# Patient Record
Sex: FEMALE | Race: WHITE | NOT HISPANIC OR LATINO | Employment: OTHER | URBAN - METROPOLITAN AREA
[De-identification: names, ages, dates, MRNs, and addresses within clinical notes are randomized per-mention and may not be internally consistent; named-entity substitution may affect disease eponyms.]

---

## 2022-01-11 ENCOUNTER — OUTSIDE FACILITY SERVICE (OUTPATIENT)
Dept: CARDIOLOGY | Facility: CLINIC | Age: 62
End: 2022-01-11

## 2022-01-11 PROCEDURE — 93306 TTE W/DOPPLER COMPLETE: CPT | Performed by: INTERNAL MEDICINE

## 2022-01-27 ENCOUNTER — PREP FOR SURGERY (OUTPATIENT)
Dept: OTHER | Facility: HOSPITAL | Age: 62
End: 2022-01-27

## 2022-01-27 ENCOUNTER — OFFICE VISIT (OUTPATIENT)
Dept: OBSTETRICS AND GYNECOLOGY | Facility: CLINIC | Age: 62
End: 2022-01-27

## 2022-01-27 VITALS
SYSTOLIC BLOOD PRESSURE: 142 MMHG | DIASTOLIC BLOOD PRESSURE: 80 MMHG | HEIGHT: 63 IN | BODY MASS INDEX: 51.91 KG/M2 | WEIGHT: 293 LBS

## 2022-01-27 DIAGNOSIS — N95.0 PMB (POSTMENOPAUSAL BLEEDING): Primary | ICD-10-CM

## 2022-01-27 DIAGNOSIS — R93.89 THICKENED ENDOMETRIUM: Primary | ICD-10-CM

## 2022-01-27 PROCEDURE — 99203 OFFICE O/P NEW LOW 30 MIN: CPT | Performed by: MIDWIFE

## 2022-01-27 RX ORDER — IRBESARTAN 150 MG/1
150 TABLET ORAL DAILY
COMMUNITY
Start: 2022-01-14 | End: 2022-04-18 | Stop reason: SDUPTHER

## 2022-01-27 RX ORDER — POTASSIUM CHLORIDE 20 MEQ/1
20 TABLET, EXTENDED RELEASE ORAL DAILY
COMMUNITY
Start: 2022-01-25 | End: 2022-05-31 | Stop reason: SDUPTHER

## 2022-01-27 RX ORDER — SODIUM CHLORIDE 0.9 % (FLUSH) 0.9 %
10 SYRINGE (ML) INJECTION AS NEEDED
Status: CANCELLED | OUTPATIENT
Start: 2022-01-27

## 2022-01-27 RX ORDER — IBUPROFEN 800 MG/1
800 TABLET ORAL 3 TIMES DAILY PRN
COMMUNITY
Start: 2022-01-14 | End: 2022-02-14

## 2022-01-27 RX ORDER — SODIUM CHLORIDE 0.9 % (FLUSH) 0.9 %
3 SYRINGE (ML) INJECTION EVERY 12 HOURS SCHEDULED
Status: CANCELLED | OUTPATIENT
Start: 2022-01-27

## 2022-01-27 RX ORDER — ASPIRIN 81 MG/1
81 TABLET ORAL DAILY
COMMUNITY
End: 2022-03-15

## 2022-01-27 RX ORDER — FUROSEMIDE 20 MG/1
20 TABLET ORAL 2 TIMES DAILY
COMMUNITY
Start: 2022-01-25 | End: 2022-05-31 | Stop reason: SDUPTHER

## 2022-01-27 NOTE — PROGRESS NOTES
"Chief Complaint   Patient presents with   • post menopausal bleeding     states she has been bleeding since 2021, has been menopausal since       Analisa Cortez is a 61 y.o. year old  presenting to be seen for PMB.  She went through menopause at age 50.  She is not on any hormones.  Her last Pap smear was over 5 years ago.  Her last mammogram was a couple of years ago.  She noticed vaginal bleeding 2021, she was diagnosed with a UTI and attributed it to that.  The day after Thanks[place], she had heavy bleeding that lasted a day but it had clots in it.   she had a heavy period that lasted 2 to 3 days.  She has noticed minimal spotting since then.    History  Past Medical History:   Diagnosis Date   • Hypertension    , Allergies:  Patient has no known allergies.    Social History    Tobacco Use      Smoking status: Never Smoker      Smokeless tobacco: Never Used      Review of Systems  Pertinent items are noted in HPI, all other systems reviewed and negative       Objective   /80   Ht 160 cm (63\")   Wt (!) 156 kg (343 lb)   BMI 60.76 kg/m²     Physical Exam:  General Appearance: alert, appears stated age and cooperative  Lungs: clear to auscultation, respirations regular, respirations even and respirations unlabored  Heart: regular rhythm and normal rate and normal S1, S2  Extremities: moves extremities well, no edema, no cyanosis and no redness  Skin: no bleeding, bruising or rash and no lesions noted  Neurologic: Mental Status orientated to person, place, time and situation, Speech normal content and execusion    Lab Review   No data reviewed    Imaging   Pelvic ultrasound report   Uterus anteverted, normal  Endometrial lining 29 mm         Assessment /Plan    Diagnoses and all orders for this visit:    1. PMB (postmenopausal bleeding) (Primary)  -     US Non-ob Transvaginal  Consulted with Dr. Pandey.    Follow up for D&C, hysteroscopy           This note was " electronically signed.  Nohelia Julian CNM  1/27/2022

## 2022-01-28 PROBLEM — R93.89 THICKENED ENDOMETRIUM: Status: ACTIVE | Noted: 2022-01-28

## 2022-01-31 ENCOUNTER — LAB (OUTPATIENT)
Dept: LAB | Facility: HOSPITAL | Age: 62
End: 2022-01-31

## 2022-01-31 ENCOUNTER — ANESTHESIA (OUTPATIENT)
Dept: PERIOP | Facility: HOSPITAL | Age: 62
End: 2022-01-31

## 2022-01-31 ENCOUNTER — HOSPITAL ENCOUNTER (OUTPATIENT)
Facility: HOSPITAL | Age: 62
Setting detail: HOSPITAL OUTPATIENT SURGERY
Discharge: HOME | End: 2022-01-31
Attending: OBSTETRICS & GYNECOLOGY | Admitting: OBSTETRICS & GYNECOLOGY

## 2022-01-31 ENCOUNTER — ANESTHESIA EVENT (OUTPATIENT)
Dept: PERIOP | Facility: HOSPITAL | Age: 62
End: 2022-01-31

## 2022-01-31 VITALS
TEMPERATURE: 98.1 F | RESPIRATION RATE: 18 BRPM | SYSTOLIC BLOOD PRESSURE: 140 MMHG | BODY MASS INDEX: 51.91 KG/M2 | DIASTOLIC BLOOD PRESSURE: 53 MMHG | HEIGHT: 63 IN | HEART RATE: 101 BPM | OXYGEN SATURATION: 97 % | WEIGHT: 293 LBS

## 2022-01-31 DIAGNOSIS — R93.89 THICKENED ENDOMETRIUM: ICD-10-CM

## 2022-01-31 LAB
AMORPH URATE CRY URNS QL MICRO: ABNORMAL /HPF
ANION GAP SERPL CALCULATED.3IONS-SCNC: 15.2 MMOL/L (ref 5–15)
BACTERIA UR QL AUTO: ABNORMAL /HPF
BASOPHILS # BLD AUTO: 0.03 10*3/MM3 (ref 0–0.2)
BASOPHILS NFR BLD AUTO: 0.5 % (ref 0–1.5)
BILIRUB UR QL STRIP: NEGATIVE
BUN SERPL-MCNC: 32 MG/DL (ref 8–23)
BUN/CREAT SERPL: 22.2 (ref 7–25)
CALCIUM SPEC-SCNC: 9.8 MG/DL (ref 8.6–10.5)
CHLORIDE SERPL-SCNC: 105 MMOL/L (ref 98–107)
CLARITY UR: CLEAR
CO2 SERPL-SCNC: 20.8 MMOL/L (ref 22–29)
COLOR UR: YELLOW
CREAT SERPL-MCNC: 1.44 MG/DL (ref 0.57–1)
DEPRECATED RDW RBC AUTO: 43.4 FL (ref 37–54)
EOSINOPHIL # BLD AUTO: 0.24 10*3/MM3 (ref 0–0.4)
EOSINOPHIL NFR BLD AUTO: 4.2 % (ref 0.3–6.2)
ERYTHROCYTE [DISTWIDTH] IN BLOOD BY AUTOMATED COUNT: 12.6 % (ref 12.3–15.4)
GFR SERPL CREATININE-BSD FRML MDRD: 37 ML/MIN/1.73
GLUCOSE SERPL-MCNC: 124 MG/DL (ref 65–99)
GLUCOSE UR STRIP-MCNC: NEGATIVE MG/DL
HCT VFR BLD AUTO: 42.9 % (ref 34–46.6)
HGB BLD-MCNC: 13.6 G/DL (ref 12–15.9)
HGB UR QL STRIP.AUTO: ABNORMAL
HYALINE CASTS UR QL AUTO: ABNORMAL /LPF
IMM GRANULOCYTES # BLD AUTO: 0.01 10*3/MM3 (ref 0–0.05)
IMM GRANULOCYTES NFR BLD AUTO: 0.2 % (ref 0–0.5)
KETONES UR QL STRIP: NEGATIVE
LEUKOCYTE ESTERASE UR QL STRIP.AUTO: ABNORMAL
LYMPHOCYTES # BLD AUTO: 0.61 10*3/MM3 (ref 0.7–3.1)
LYMPHOCYTES NFR BLD AUTO: 10.6 % (ref 19.6–45.3)
MCH RBC QN AUTO: 30 PG (ref 26.6–33)
MCHC RBC AUTO-ENTMCNC: 31.7 G/DL (ref 31.5–35.7)
MCV RBC AUTO: 94.7 FL (ref 79–97)
MONOCYTES # BLD AUTO: 0.37 10*3/MM3 (ref 0.1–0.9)
MONOCYTES NFR BLD AUTO: 6.4 % (ref 5–12)
NEUTROPHILS NFR BLD AUTO: 4.51 10*3/MM3 (ref 1.7–7)
NEUTROPHILS NFR BLD AUTO: 78.1 % (ref 42.7–76)
NITRITE UR QL STRIP: NEGATIVE
NRBC BLD AUTO-RTO: 0 /100 WBC (ref 0–0.2)
PH UR STRIP.AUTO: <=5 [PH] (ref 5–8)
PLATELET # BLD AUTO: 203 10*3/MM3 (ref 140–450)
PMV BLD AUTO: 12.2 FL (ref 6–12)
POTASSIUM SERPL-SCNC: 4.8 MMOL/L (ref 3.5–5.2)
PROT UR QL STRIP: NEGATIVE
QT INTERVAL: 340 MS
QTC INTERVAL: 482 MS
RBC # BLD AUTO: 4.53 10*6/MM3 (ref 3.77–5.28)
RBC # UR STRIP: ABNORMAL /HPF
REF LAB TEST METHOD: ABNORMAL
SARS-COV-2 RNA PNL SPEC NAA+PROBE: NOT DETECTED
SODIUM SERPL-SCNC: 141 MMOL/L (ref 136–145)
SP GR UR STRIP: 1.02 (ref 1–1.03)
SQUAMOUS #/AREA URNS HPF: ABNORMAL /HPF
UROBILINOGEN UR QL STRIP: ABNORMAL
WBC # UR STRIP: ABNORMAL /HPF
WBC NRBC COR # BLD: 5.77 10*3/MM3 (ref 3.4–10.8)

## 2022-01-31 PROCEDURE — 85025 COMPLETE CBC W/AUTO DIFF WBC: CPT | Performed by: OBSTETRICS & GYNECOLOGY

## 2022-01-31 PROCEDURE — 36415 COLL VENOUS BLD VENIPUNCTURE: CPT

## 2022-01-31 PROCEDURE — C9803 HOPD COVID-19 SPEC COLLECT: HCPCS

## 2022-01-31 PROCEDURE — 93005 ELECTROCARDIOGRAM TRACING: CPT | Performed by: OBSTETRICS & GYNECOLOGY

## 2022-01-31 PROCEDURE — S0260 H&P FOR SURGERY: HCPCS | Performed by: OBSTETRICS & GYNECOLOGY

## 2022-01-31 PROCEDURE — 0 LIDOCAINE 1 % SOLUTION: Performed by: OBSTETRICS & GYNECOLOGY

## 2022-01-31 PROCEDURE — 58120 DILATION AND CURETTAGE: CPT | Performed by: OBSTETRICS & GYNECOLOGY

## 2022-01-31 PROCEDURE — 80048 BASIC METABOLIC PNL TOTAL CA: CPT | Performed by: OBSTETRICS & GYNECOLOGY

## 2022-01-31 PROCEDURE — 87635 SARS-COV-2 COVID-19 AMP PRB: CPT | Performed by: OBSTETRICS & GYNECOLOGY

## 2022-01-31 PROCEDURE — 81001 URINALYSIS AUTO W/SCOPE: CPT | Performed by: OBSTETRICS & GYNECOLOGY

## 2022-01-31 PROCEDURE — 25010000002 FENTANYL CITRATE (PF) 50 MCG/ML SOLUTION: Performed by: NURSE ANESTHETIST, CERTIFIED REGISTERED

## 2022-01-31 PROCEDURE — 25010000002 MIDAZOLAM PER 1MG: Performed by: NURSE ANESTHETIST, CERTIFIED REGISTERED

## 2022-01-31 RX ORDER — IBUPROFEN 800 MG/1
800 TABLET ORAL EVERY 8 HOURS PRN
Qty: 30 TABLET | Refills: 0 | Status: SHIPPED | OUTPATIENT
Start: 2022-01-31 | End: 2022-02-14

## 2022-01-31 RX ORDER — SODIUM CHLORIDE 0.9 % (FLUSH) 0.9 %
10 SYRINGE (ML) INJECTION AS NEEDED
Status: DISCONTINUED | OUTPATIENT
Start: 2022-01-31 | End: 2022-01-31 | Stop reason: HOSPADM

## 2022-01-31 RX ORDER — ONDANSETRON HYDROCHLORIDE 8 MG/1
8 TABLET, FILM COATED ORAL EVERY 8 HOURS PRN
Qty: 10 TABLET | Refills: 0 | Status: SHIPPED | OUTPATIENT
Start: 2022-01-31 | End: 2022-02-14

## 2022-01-31 RX ORDER — PROMETHAZINE HYDROCHLORIDE 12.5 MG/1
12.5 TABLET ORAL ONCE AS NEEDED
Status: DISCONTINUED | OUTPATIENT
Start: 2022-01-31 | End: 2022-01-31 | Stop reason: HOSPADM

## 2022-01-31 RX ORDER — SODIUM CHLORIDE 0.9 % (FLUSH) 0.9 %
3 SYRINGE (ML) INJECTION EVERY 12 HOURS SCHEDULED
Status: DISCONTINUED | OUTPATIENT
Start: 2022-01-31 | End: 2022-01-31 | Stop reason: HOSPADM

## 2022-01-31 RX ORDER — LIDOCAINE HYDROCHLORIDE 10 MG/ML
INJECTION, SOLUTION INFILTRATION; PERINEURAL AS NEEDED
Status: DISCONTINUED | OUTPATIENT
Start: 2022-01-31 | End: 2022-01-31 | Stop reason: HOSPADM

## 2022-01-31 RX ORDER — MIDAZOLAM HYDROCHLORIDE 2 MG/2ML
INJECTION, SOLUTION INTRAMUSCULAR; INTRAVENOUS AS NEEDED
Status: DISCONTINUED | OUTPATIENT
Start: 2022-01-31 | End: 2022-01-31 | Stop reason: SURG

## 2022-01-31 RX ORDER — ACETAMINOPHEN 500 MG
1000 TABLET ORAL EVERY 8 HOURS PRN
Qty: 60 TABLET | Refills: 0 | Status: SHIPPED | OUTPATIENT
Start: 2022-01-31 | End: 2022-02-15

## 2022-01-31 RX ORDER — ONDANSETRON 4 MG/1
4 TABLET, FILM COATED ORAL ONCE AS NEEDED
Status: DISCONTINUED | OUTPATIENT
Start: 2022-01-31 | End: 2022-01-31 | Stop reason: HOSPADM

## 2022-01-31 RX ORDER — FENTANYL CITRATE 50 UG/ML
INJECTION, SOLUTION INTRAMUSCULAR; INTRAVENOUS AS NEEDED
Status: DISCONTINUED | OUTPATIENT
Start: 2022-01-31 | End: 2022-01-31 | Stop reason: SURG

## 2022-01-31 RX ORDER — KETAMINE HCL IN NACL, ISO-OSM 100MG/10ML
SYRINGE (ML) INJECTION AS NEEDED
Status: DISCONTINUED | OUTPATIENT
Start: 2022-01-31 | End: 2022-01-31 | Stop reason: SURG

## 2022-01-31 RX ORDER — LABETALOL HYDROCHLORIDE 5 MG/ML
INJECTION, SOLUTION INTRAVENOUS AS NEEDED
Status: DISCONTINUED | OUTPATIENT
Start: 2022-01-31 | End: 2022-01-31 | Stop reason: SURG

## 2022-01-31 RX ORDER — MAGNESIUM HYDROXIDE 1200 MG/15ML
LIQUID ORAL AS NEEDED
Status: DISCONTINUED | OUTPATIENT
Start: 2022-01-31 | End: 2022-01-31 | Stop reason: HOSPADM

## 2022-01-31 RX ORDER — SODIUM CHLORIDE, SODIUM LACTATE, POTASSIUM CHLORIDE, CALCIUM CHLORIDE 600; 310; 30; 20 MG/100ML; MG/100ML; MG/100ML; MG/100ML
1000 INJECTION, SOLUTION INTRAVENOUS CONTINUOUS
Status: DISCONTINUED | OUTPATIENT
Start: 2022-01-31 | End: 2022-01-31 | Stop reason: HOSPADM

## 2022-01-31 RX ORDER — ONDANSETRON 2 MG/ML
4 INJECTION INTRAMUSCULAR; INTRAVENOUS ONCE AS NEEDED
Status: DISCONTINUED | OUTPATIENT
Start: 2022-01-31 | End: 2022-01-31 | Stop reason: HOSPADM

## 2022-01-31 RX ADMIN — MIDAZOLAM HYDROCHLORIDE 1 MG: 1 INJECTION, SOLUTION INTRAMUSCULAR; INTRAVENOUS at 11:38

## 2022-01-31 RX ADMIN — MIDAZOLAM HYDROCHLORIDE 1 MG: 1 INJECTION, SOLUTION INTRAMUSCULAR; INTRAVENOUS at 11:45

## 2022-01-31 RX ADMIN — Medication 10 MG: at 11:38

## 2022-01-31 RX ADMIN — Medication 10 MG: at 11:45

## 2022-01-31 RX ADMIN — LABETALOL HYDROCHLORIDE 10 MG: 5 INJECTION, SOLUTION INTRAVENOUS at 11:44

## 2022-01-31 RX ADMIN — FENTANYL CITRATE 50 MCG: 50 INJECTION INTRAMUSCULAR; INTRAVENOUS at 11:53

## 2022-01-31 RX ADMIN — SODIUM CHLORIDE, POTASSIUM CHLORIDE, SODIUM LACTATE AND CALCIUM CHLORIDE 1000 ML: 600; 310; 30; 20 INJECTION, SOLUTION INTRAVENOUS at 11:24

## 2022-01-31 NOTE — ANESTHESIA POSTPROCEDURE EVALUATION
Patient: Analisa Cortez    Procedure Summary     Date: 01/31/22 Room / Location: [facility] OR 1 /  [facility]    Anesthesia Start: 1127 Anesthesia Stop: 1210    Procedure: DILATATION AND CURETTAGE HYSTEROSCOPY (N/A Uterus) Diagnosis:       Thickened endometrium      (Thickened endometrium [R93.89])    Surgeons: Estella Pandey MD Provider: Lee Ann Carter CRNA    Anesthesia Type: MAC ASA Status: 3          Anesthesia Type: MAC    Vitals  Vitals Value Taken Time   /48 01/31/22 1210   Temp 98.1 °F (36.7 °C) 01/31/22 1210   Pulse 115 01/31/22 1210   Resp 16 01/31/22 1210   SpO2 91 % 01/31/22 1210           Post Anesthesia Care and Evaluation    Patient location during evaluation: PHASE II  Patient participation: complete - patient participated  Level of consciousness: awake and alert  Pain score: 0  Pain management: satisfactory to patient  Airway patency: patent  Anesthetic complications: No anesthetic complications  PONV Status: none  Cardiovascular status: acceptable and stable  Respiratory status: acceptable  Hydration status: acceptable

## 2022-01-31 NOTE — ANESTHESIA PREPROCEDURE EVALUATION
Anesthesia Evaluation     Patient summary reviewed and Nursing notes reviewed   no history of anesthetic complications:  NPO Solid Status: > 8 hours  NPO Liquid Status: > 8 hours           Airway   Mallampati: II  TM distance: >3 FB  Possible difficult intubation  Dental - normal exam     Pulmonary - negative pulmonary ROS and normal exam   Cardiovascular - normal exam  Exercise tolerance: poor (<4 METS)    ECG reviewed    (+) hypertension poorly controlled 2 medications or greater, dysrhythmias Atrial Fib,     ROS comment: ECHO: EF 45-50%    Neuro/Psych- negative ROS  GI/Hepatic/Renal/Endo    (+) morbid obesity,      Musculoskeletal     Abdominal   (+) obese,    Substance History      OB/GYN          Other   arthritis,                    Anesthesia Plan    ASA 3     MAC   (Discussed POC with Dr. Pandey. Discussed risks and benefits. Decided to proceed with minimal sedation and biopsy to get diagnosis. Pt and daughter-in-law informed of risks and benefits )  intravenous induction     Anesthetic plan, all risks, benefits, and alternatives have been provided, discussed and informed consent has been obtained with: patient.    Plan discussed with CRNA.        CODE STATUS:

## 2022-02-02 LAB
LAB AP CASE REPORT: NORMAL
PATH REPORT.FINAL DX SPEC: NORMAL

## 2022-02-04 ENCOUNTER — DOCUMENTATION (OUTPATIENT)
Dept: CARDIOLOGY | Facility: CLINIC | Age: 62
End: 2022-02-04

## 2022-02-04 NOTE — PROGRESS NOTES
New Patient Office Visit      Patient Name: Analisa Cortez  : 1960     Location: [place]    ID: Analisa Cortez is a 61 y.o. female resident of [place], [place]    Chief Complaint:  Atrial fibrillation with RVR, surgical clearance for total hysterectomy    Problem list:  1. Atrial fibrillation  a. EKG 2022: Atrial fibrillation with RVR  2. Congestive heart failure  a. CXR 2021: Pulmonary vascular engorgement and cardiomegaly.  Calcified granuloma right upper lobe.  b. BNP 2021: 266  c. Echo 2022: LV systolic function mildly decreased.  LVEF 45 to 50%.  Trace MR  3. Hypertension  4. Morbid obesity with BMI of 70  5. Endometrioid adenocarcinoma, requiring surgical clearance  a. Pathology report 2022: Endometrioid carcinoma, referral to gynecologic oncology 2022  b. Patient scheduled for complete hysterectomy on 22. Oncology surgery would like to hold anticoagulation until immediately after surgery.  6. Surgical history  a. D&C hysteroscopy 2022    History of Present Illness: Analisa Cortez is a 61 y.o. female who presents to the cardiology office today to establish care for atrial fibrillation and for surgical clearance.  She has a past medical history significant for HTN, HFmEF, endometrial cancer, and new onset atrial fibrillation. The patient notes that she began the feel palpitations at the beginning of December, but was not officially diagnosed until . She was started on carvedilol which has since been switched to metoprolol 25 mg, Lasix 20 mg twice daily, aspirin 81 mg. The patient was not anticoagulated secondary to vaginal bleeding. The patient underwent D&C and was found to have grade 2 endometrial carcinoma and is scheduled for total hysterectomy on 2022. From a cardiac standpoint, the patient notes bilateral lower extremity edema and orthopnea. She sleeps in her recliner every night. She does deny any associated chest pain. Her  primary care ordered an echo on 2022 which revealed mildly decreased LV function with LVEF 45 to 50%. Trace MR. Today, the patient is in atrial fibrillation with a rate of 102..     Subjective      Review of Systems:   Review of Systems   Respiratory: Negative for cough, chest tightness, shortness of breath and wheezing.    Cardiovascular: Positive for palpitations and leg swelling. Negative for chest pain.   All other systems reviewed and are negative.      Past Medical History:   Past Medical History:   Diagnosis Date   • Arthritis    • Atrial fibrillation (HCC)    • Frequent UTI    • Heart rate fast     at times   • Hypertension    • Seasonal allergies    • Wears glasses     for reading       Past Surgical History:   Past Surgical History:   Procedure Laterality Date   • DILATATION AND CURETTAGE N/A 2022    Procedure: DILATATION AND CURETTAGE;  Surgeon: Estella Pandey MD;  Location: [facility];  Service: Obstetrics/Gynecology;  Laterality: N/A;   • NO PAST SURGERIES         Family History:   Family History   Problem Relation Age of Onset   • No Known Problems Father    • Osteoporosis Mother    • Arthritis Mother    • Cancer Maternal Aunt         Bladder   • Breast cancer Cousin    • Breast cancer Cousin    • Ovarian cancer Neg Hx    • Uterine cancer Neg Hx    • Colon cancer Neg Hx    • Melanoma Neg Hx    • Prostate cancer Neg Hx        Social History:   Social History     Socioeconomic History   • Marital status:    Tobacco Use   • Smoking status: Never Smoker   • Smokeless tobacco: Never Used   Vaping Use   • Vaping Use: Never used   Substance and Sexual Activity   • Alcohol use: Never   • Drug use: Never   • Sexual activity: Not Currently     Birth control/protection: Post-menopausal     Comment:          Tobacco History:   Social History     Tobacco Use   Smoking Status Never Smoker   Smokeless Tobacco Never Used       Medications:     Current Outpatient Medications:   •   "aspirin 81 MG EC tablet, Take 81 mg by mouth Daily., Disp: , Rfl:   •  furosemide (LASIX) 20 MG tablet, Take 20 mg by mouth 2 (Two) Times a Day., Disp: , Rfl:   •  ibuprofen (ADVIL,MOTRIN) 800 MG tablet, Take 800 mg by mouth Every 6 (Six) Hours As Needed for Mild Pain ., Disp: , Rfl:   •  irbesartan (AVAPRO) 150 MG tablet, Take 150 mg by mouth Daily., Disp: , Rfl:   •  potassium chloride (K-DUR,KLOR-CON) 20 MEQ CR tablet, Take 20 mEq by mouth Daily., Disp: , Rfl:   •  metoprolol tartrate (LOPRESSOR) 50 MG tablet, Take 1 tablet by mouth 2 (Two) Times a Day., Disp: 180 tablet, Rfl: 3    Allergies:   No Known Allergies    Objective     Vital Signs:   Vitals:    02/15/22 0955   BP: 140/76   BP Location: Right arm   Patient Position: Sitting   Pulse: 102   SpO2: 99%   Weight: (!) 155 kg (342 lb)   Height: 148.8 cm (58.6\")     Body mass index is 70.02 kg/m².     Physical Exam:  Physical Exam  Vitals and nursing note reviewed.   Constitutional:       Appearance: Normal appearance. She is obese.   HENT:      Head: Normocephalic and atraumatic.   Cardiovascular:      Rate and Rhythm: Rhythm irregular.      Pulses: Normal pulses.      Heart sounds: Normal heart sounds.      Comments: Atrial Fibrillation with RVR  Pulmonary:      Effort: Pulmonary effort is normal. No respiratory distress.      Breath sounds: No wheezing.   Abdominal:      Palpations: Abdomen is soft.   Neurological:      Mental Status: She is alert.         Assessment     Problems Addressed This Visit    ICD-10-CM ICD-9-CM   1. Atrial fibrillation with RVR ([facility])  I48.91 427.31   2. Primary hypertension  I10 401.9   3. Endometrial cancer ([facility])  C54.1 182.0   4. Morbid obesity with BMI of 70 and over, adult ([facility])  E66.01 278.01    Z68.45 V85.45     Plan   The patient is a pleasant 61-year-old female with hypertension and recent diagnosis of atrial fibrillation with uncontrolled ventricular rate. She is also been diagnosed with endometrial cancer with ongoing " vaginal bleeding. Anticoagulation therapy could not be initiated due to this reason. She is referred for further cardiac risk assessment and management of atrial fibrillation before proceeding to planned hysterectomy.   She has no current symptoms of angina or CHF and her ejection fraction is acceptable by echocardiogram.   Due to large body habitus she is not a suitable candidate for stress testing and I think it is reasonably acceptable to proceed to planned hysterectomy without need of further cardiac assessment considering preserved EF, no previous cardiac events and absence of angina/CHF type symptoms.   For management of atrial fibrillation at this time we will increase Lopressor to 50mg BID for better rate control.  Gynecology/Oncology has agreed to to start Eliquis 5mg BID after hysterectomy.  We will schedule GENOVEVA/ECV after optimal anticoagulation if patient is still in atrial fibrillation.  Okay to proceed with hysterectomy from cardiac standpoint.  Blood pressure is well controlled. Continue current regimen increased dose of metoprolol.  Follow-up with cardiology in 1 month.  Thank you for allowing us to participate in the care of your patient.    Plan of care reviewed with patient at the conclusion of today's visit. Education was provided regarding diagnosis and management.  Patient verbalizes understanding of and agreement with management plan.    Follow Up:   Return in about 4 weeks (around 3/15/2022).    Scribed for Natalia Davidson MD by Marina Freeman PA-C. 2/15/2022  10:25 Natalia POOL MD, personally performed the services described in this documentation as scribed by the above named individual in my presence, and it is both accurate and complete.  2/16/2022  06:29 EST

## 2022-02-08 ENCOUNTER — OFFICE VISIT (OUTPATIENT)
Dept: OBSTETRICS AND GYNECOLOGY | Facility: CLINIC | Age: 62
End: 2022-02-08

## 2022-02-08 VITALS
BODY MASS INDEX: 51.91 KG/M2 | DIASTOLIC BLOOD PRESSURE: 86 MMHG | SYSTOLIC BLOOD PRESSURE: 130 MMHG | HEIGHT: 63 IN | WEIGHT: 293 LBS

## 2022-02-08 DIAGNOSIS — Z09 POSTOPERATIVE FOLLOW-UP: Primary | ICD-10-CM

## 2022-02-08 DIAGNOSIS — C54.1 ENDOMETRIAL CANCER: ICD-10-CM

## 2022-02-08 PROCEDURE — 99024 POSTOP FOLLOW-UP VISIT: CPT | Performed by: PHYSICIAN ASSISTANT

## 2022-02-08 NOTE — PROGRESS NOTES
Subjective   Chief Complaint   Patient presents with   • Post-op     8 days post-op  D&C, doing well       Analisa Cortez is a 61 y.o. year old  presenting to be seen for post op visit  Patient accompanied by her daughter-in-law   She is doing well 8 days post op D&C hysteroscopy for postmenopausal bleeding   Normal bowel and bladder function. No vaginal bleeding or spotting for the past 4 days  Pathology unfortunately confirms well to moderately differentiated endometrioid adenocarcinoma      Past Medical History:   Diagnosis Date   • Arthritis    • Frequent UTI    • Heart rate fast     at times   • Hypertension    • Seasonal allergies    • Wears glasses     for reading        Current Outpatient Medications:   •  acetaminophen (TYLENOL) 500 MG tablet, Take 2 tablets by mouth Every 8 (Eight) Hours As Needed for Mild Pain  or Moderate Pain ., Disp: 60 tablet, Rfl: 0  •  aspirin 81 MG EC tablet, Take 81 mg by mouth Daily., Disp: , Rfl:   •  furosemide (LASIX) 20 MG tablet, Take 20 mg by mouth 2 (Two) Times a Day., Disp: , Rfl:   •  ibuprofen (ADVIL,MOTRIN) 800 MG tablet, Take 800 mg by mouth 3 (Three) Times a Day As Needed., Disp: , Rfl:   •  ibuprofen (ADVIL,MOTRIN) 800 MG tablet, Take 1 tablet by mouth Every 8 (Eight) Hours As Needed for Mild Pain ., Disp: 30 tablet, Rfl: 0  •  irbesartan (AVAPRO) 150 MG tablet, Take 150 mg by mouth Daily., Disp: , Rfl:   •  metoprolol tartrate (LOPRESSOR) 25 MG tablet, Take 25 mg by mouth 2 (Two) Times a Day., Disp: , Rfl:   •  ondansetron (ZOFRAN) 8 MG tablet, Take 1 tablet by mouth Every 8 (Eight) Hours As Needed for Nausea or Vomiting., Disp: 10 tablet, Rfl: 0  •  potassium chloride (K-DUR,KLOR-CON) 20 MEQ CR tablet, Take 20 mEq by mouth Daily., Disp: , Rfl:    No Known Allergies   Past Surgical History:   Procedure Laterality Date   • DILATATION AND CURETTAGE N/A 2022    Procedure: DILATATION AND CURETTAGE;  Surgeon: Estella Pandey MD;  Location: [facility];  Service:  "Obstetrics/Gynecology;  Laterality: N/A;   • NO PAST SURGERIES        Social History     Socioeconomic History   • Marital status:    Tobacco Use   • Smoking status: Never Smoker   • Smokeless tobacco: Never Used   Vaping Use   • Vaping Use: Never used   Substance and Sexual Activity   • Alcohol use: Never   • Drug use: Never   • Sexual activity: Defer     Birth control/protection: Post-menopausal      History reviewed. No pertinent family history.    Review of Systems   Constitutional: Negative for chills, diaphoresis and fever.   Gastrointestinal: Negative for constipation, diarrhea, nausea and vomiting.   Genitourinary: Negative for difficulty urinating, dysuria, pelvic pain and vaginal bleeding.           Objective   /86   Ht 160 cm (63\")   Wt (!) 156 kg (344 lb)   Breastfeeding No   BMI 60.94 kg/m²     Physical Exam  Constitutional:       Appearance: Normal appearance. She is well-developed and well-groomed. She is morbidly obese.   Eyes:      General: Lids are normal.      Extraocular Movements: Extraocular movements intact.   Abdominal:      General: Abdomen is protuberant.      Palpations: Abdomen is soft.      Tenderness: There is no abdominal tenderness.   Neurological:      Mental Status: She is alert.   Psychiatric:         Attention and Perception: Attention normal.         Mood and Affect: Mood normal.         Speech: Speech normal.         Behavior: Behavior is cooperative.            Result Review :       Data reviewed: pathology            Assessment and Plan  Diagnoses and all orders for this visit:    1. Postoperative follow-up (Primary)    2. Endometrial cancer (HCC)  -     Ambulatory Referral to Gynecologic Oncology      Patient Instructions   Follow up prn             This note was electronically signed.    Sonia Mayo PA-C   February 8, 2022  "

## 2022-02-14 ENCOUNTER — OFFICE VISIT (OUTPATIENT)
Dept: GYNECOLOGIC ONCOLOGY | Facility: CLINIC | Age: 62
End: 2022-02-14

## 2022-02-14 VITALS
WEIGHT: 293 LBS | SYSTOLIC BLOOD PRESSURE: 129 MMHG | RESPIRATION RATE: 20 BRPM | DIASTOLIC BLOOD PRESSURE: 95 MMHG | HEART RATE: 72 BPM | OXYGEN SATURATION: 99 % | TEMPERATURE: 96.9 F | BODY MASS INDEX: 59.07 KG/M2 | HEIGHT: 59 IN

## 2022-02-14 DIAGNOSIS — R06.09 DYSPNEA ON EXERTION: ICD-10-CM

## 2022-02-14 DIAGNOSIS — I48.19 PERSISTENT ATRIAL FIBRILLATION: ICD-10-CM

## 2022-02-14 DIAGNOSIS — C54.1 ENDOMETRIAL CANCER: Primary | ICD-10-CM

## 2022-02-14 DIAGNOSIS — I10 PRIMARY HYPERTENSION: ICD-10-CM

## 2022-02-14 DIAGNOSIS — E66.01 MORBID OBESITY WITH BMI OF 70 AND OVER, ADULT: ICD-10-CM

## 2022-02-14 PROCEDURE — 99205 OFFICE O/P NEW HI 60 MIN: CPT | Performed by: OBSTETRICS & GYNECOLOGY

## 2022-02-14 RX ORDER — OXYCODONE HYDROCHLORIDE 5 MG/1
5 TABLET ORAL ONCE
Status: CANCELLED | OUTPATIENT
Start: 2022-02-14 | End: 2022-02-14

## 2022-02-14 RX ORDER — ACETAMINOPHEN 325 MG/1
1000 TABLET ORAL ONCE
Status: CANCELLED | OUTPATIENT
Start: 2022-02-14 | End: 2022-02-14

## 2022-02-14 RX ORDER — MULTIVIT WITH MINERALS/LUTEIN
250 TABLET ORAL DAILY
COMMUNITY
End: 2022-02-15

## 2022-02-14 RX ORDER — HEPARIN SODIUM 5000 [USP'U]/ML
5000 INJECTION, SOLUTION INTRAVENOUS; SUBCUTANEOUS ONCE
Status: CANCELLED | OUTPATIENT
Start: 2022-02-14 | End: 2022-02-14

## 2022-02-14 RX ORDER — GABAPENTIN 100 MG/1
300 CAPSULE ORAL ONCE
Status: CANCELLED | OUTPATIENT
Start: 2022-02-14 | End: 2022-02-14

## 2022-02-14 RX ORDER — SODIUM CHLORIDE, SODIUM LACTATE, POTASSIUM CHLORIDE, CALCIUM CHLORIDE 600; 310; 30; 20 MG/100ML; MG/100ML; MG/100ML; MG/100ML
100 INJECTION, SOLUTION INTRAVENOUS CONTINUOUS
Status: CANCELLED | OUTPATIENT
Start: 2022-02-14

## 2022-02-14 NOTE — PROGRESS NOTES
New Patient Office Visit      Patient Name: Analisa Cortez  : 1960   MRN: [id number]     Referring Physician: Sonia Mayo PA-C / Dr. Estella Pandey    Chief Complaint:    Chief Complaint   Patient presents with   • Consult     Endometrial Cancer       History of Present Illness: Analisa Cortez is a 61 y.o. female who is here today as a consultation with gynecologic oncology.  She started having postmenopausal bleeding in 10/2021. She had a UTI at that time and originally she thought that was related to the bleeding. However, she continued to have a few episodes of spotting and some small blood clots in November and December. She went to see Dr. Pandey in January and she had an US that showed an endometrial thickness of 29 mm and underwent hysteroscopy D&C on 22. Pathology report for that showed FIGO grade 2 endometrioid adenocarcinoma. Since surgery, she reports that she is doing well and has not had any more vaginal bleeding. She was recently diagnosed with atrial fibrillation and is going to see cardiology tomorrow.     Oncologic History:  Oncology/Hematology History   Endometrial cancer (HCC)   2022 Initial Diagnosis    Referred by LATONIA Leung/Dr. Estella Pandey    Patient presented with postmenopausal bleeding    2022: TVUS with uterus with endometrial stripe thickness of 29 mm.  Unable to visualize either ovary.  No obvious adnexal masses.  Scant free fluid.  2022: Hysteroscopy with D&C with well to moderately differentiated endometrioid adenocarcinoma          Past Medical History:   Past Medical History:   Diagnosis Date   • Arthritis    • Atrial fibrillation (HCC)    • Frequent UTI    • Heart rate fast     at times   • Hypertension    • Seasonal allergies    • Wears glasses     for reading       Past Surgical History:   Past Surgical History:   Procedure Laterality Date   • DILATATION AND CURETTAGE N/A 2022    Procedure: DILATATION AND CURETTAGE;  Surgeon: Estella Pandey,  MD;  Location: [facility];  Service: Obstetrics/Gynecology;  Laterality: N/A;   • NO PAST SURGERIES         Family History:   Family History   Problem Relation Age of Onset   • No Known Problems Father    • Osteoporosis Mother    • Arthritis Mother    • Cancer Maternal Aunt         Bladder   • Breast cancer Cousin    • Breast cancer Cousin    • Ovarian cancer Neg Hx    • Uterine cancer Neg Hx    • Colon cancer Neg Hx    • Melanoma Neg Hx    • Prostate cancer Neg Hx        Social History:   Social History     Socioeconomic History   • Marital status:    Tobacco Use   • Smoking status: Never Smoker   • Smokeless tobacco: Never Used   Vaping Use   • Vaping Use: Never used   Substance and Sexual Activity   • Alcohol use: Never   • Drug use: Never   • Sexual activity: Not Currently     Birth control/protection: Post-menopausal     Comment:          Past OB/GYN History:   OB History    Para Term  AB Living      SAB IAB Ectopic Molar Multiple Live Births                    # Outcome Date GA Lbr Carmelo/2nd Weight Sex Delivery Anes PTL Lv   1 Term                 Health Maintenance:   Mammogram: Date: ? Results: Reportedly normal  Colonoscopy: Date: Never done Results: N/A    Medications:     Current Outpatient Medications:   •  acetaminophen (TYLENOL) 500 MG tablet, Take 2 tablets by mouth Every 8 (Eight) Hours As Needed for Mild Pain  or Moderate Pain ., Disp: 60 tablet, Rfl: 0  •  aspirin 81 MG EC tablet, Take 81 mg by mouth Daily., Disp: , Rfl:   •  furosemide (LASIX) 20 MG tablet, Take 20 mg by mouth 2 (Two) Times a Day., Disp: , Rfl:   •  irbesartan (AVAPRO) 150 MG tablet, Take 150 mg by mouth Daily., Disp: , Rfl:   •  metoprolol tartrate (LOPRESSOR) 25 MG tablet, Take 25 mg by mouth 2 (Two) Times a Day., Disp: , Rfl:   •  potassium chloride (K-DUR,KLOR-CON) 20 MEQ CR tablet, Take 20 mEq by mouth Daily., Disp: , Rfl:   •  vitamin C (ASCORBIC ACID) 250 MG tablet, Take  "250 mg by mouth Daily., Disp: , Rfl:     Allergies:   No Known Allergies    Review of Systems:   Review of Systems   Constitutional: Negative for appetite change, chills, fever and unexpected weight change.   HENT: Negative for sneezing and sore throat.    Respiratory: Positive for shortness of breath.    Cardiovascular: Positive for palpitations. Negative for chest pain and leg swelling.   Gastrointestinal: Negative for abdominal distention, abdominal pain, constipation, diarrhea, nausea and vomiting.   Genitourinary: Negative for difficulty urinating, dyspareunia, dysuria, vaginal bleeding, vaginal discharge and vaginal pain.   Musculoskeletal: Positive for back pain and joint swelling.   Neurological: Negative for light-headedness and headaches.        Objective     Physical Exam:  Vital Signs:   Vitals:    02/14/22 1324   BP: 129/95  Comment: Right Wrist   Pulse: 72   Resp: 20   Temp: 96.9 °F (36.1 °C)   TempSrc: Infrared   SpO2: 99%  Comment: RA   Weight: (!) 155 kg (341 lb)   Height: 148.6 cm (58.5\")   PainSc: 0-No pain     BMI: Body mass index is 70.06 kg/m².   ECOG score: 0           PHQ-2 Depression Screening  Little interest or pleasure in doing things? 0   Feeling down, depressed, or hopeless? 0   PHQ-2 Total Score 0     Physical Exam  Vitals and nursing note reviewed. Exam conducted with a chaperone present.   Constitutional:       General: She is not in acute distress.     Appearance: Normal appearance. She is well-developed. She is obese. She is not ill-appearing or diaphoretic.   HENT:      Head: Normocephalic and atraumatic.      Right Ear: External ear normal.      Left Ear: External ear normal.      Mouth/Throat:      Comments: Mask in place.   Eyes:      General: No scleral icterus.        Right eye: No discharge.         Left eye: No discharge.      Conjunctiva/sclera: Conjunctivae normal.   Neck:      Thyroid: No thyromegaly.   Cardiovascular:      Rate and Rhythm: Normal rate. Rhythm irregular. "      Heart sounds: Normal heart sounds. No murmur heard.      Pulmonary:      Effort: Pulmonary effort is normal. No respiratory distress.      Breath sounds: Normal breath sounds. No wheezing.   Abdominal:      General: There is no distension.      Palpations: Abdomen is soft. There is no mass.      Tenderness: There is no abdominal tenderness. There is no guarding.      Hernia: No hernia is present.      Comments: Exam limited by patient's body habitus.    Genitourinary:     Exam position: Lithotomy position.      Labia:         Right: No lesion.         Left: No lesion.       Vagina: Normal. No vaginal discharge or lesions.      Cervix: No lesion or cervical bleeding.      Comments: Uterus and adnexa unable to be palpated due to patient's body habitus  Musculoskeletal:         General: No swelling, tenderness, deformity or signs of injury. Normal range of motion.      Cervical back: Normal range of motion and neck supple.      Right lower leg: No edema.      Left lower leg: No edema.   Lymphadenopathy:      Cervical: No cervical adenopathy.   Skin:     General: Skin is warm and dry.      Findings: No erythema, lesion or rash.   Neurological:      General: No focal deficit present.      Mental Status: She is alert and oriented to person, place, and time. Mental status is at baseline.   Psychiatric:         Mood and Affect: Mood normal.         Behavior: Behavior normal.         Thought Content: Thought content normal.       Imaging: Images reviewed directly by me. Agree with impression.  1/27/2022: TVUS with uterus with endometrial stripe thickness of 29 mm.  Unable to visualize either ovary.  No obvious adnexal masses.  Scant free fluid.    Pathology:  1/31/2022: Hysteroscopy with D&C with well to moderately differentiated endometrioid adenocarcinoma  Assessment / Plan    Analisa Cortez is a 61 y.o. female who is recently diagnosed with FIGO grade 2 endometrial cancer.  While surgery is typically the cornerstone  of management for endometrial cancer we did discuss alternatives of treatment especially in light of the patient's super morbid obesity.  We discussed the alternatives of use of hormonal management with IUD placement as well as radiation.  I am concerned about the ability to adequately dose patient given her body habitus leading to a much higher risk of progression or recurrence of disease.  We also discussed that given the grade 2 findings that I am concerned that she would feel hormonal management.  After an extensive discussion including discussion of surgical risk (see below) the patient would like to proceed with attempted surgical management.    We discussed surgical management via robotic hysterectomy, bilateral salpingo-oophorectomy with sentinel lymph node evaluation +/- complete lymphadenectomy as indicated.  The patient has minimal descensus on exam today and it would not be a good candidate for vaginal hysterectomy.The patient was also counseled extensively on the nature of endometrial cancer and the fact that a majority are found in the early stages. However, if more advanced disease is encountered, she may require chemotherapy, radiation or a combination of the two. We also discussed risks of the procedure including bleeding, infection, wound breakdown, blood clots, injury to surrounding organs including the bowel, bladder, blood vessels, nerves, and ureters requiring additional intervention or procedures. We also discussed the risk of development of lymphedema particularly if a complete lymphadenectomy is needed. and the need for a laparotomy if the surgery cannot be performed safely via the minimally invasive approach. We also discussed the anticipated hospital stay and recovery time. All of the patient's questions were answered satisfactorily and verbal consent was obtained. Labs, EKG, CXR and COVID test ordered as below.    Morbid obesity (current Body mass index is 70.06 kg/m².): I counseled her  that excess body weight is associated with the development of many health issues (including cancer and heart disease) and increases the risk of surgical complications. Today, we reviewed in extensive detail the impact of obesity on the ability to perform robotic surgery. We discussed the risk of inability to ventilate the patient adequately in the required steep Trendelenburg position.  In the event that we are unable to do this we would need to perform an exploratory laparotomy for completion of surgery.  We discussed that in the event that laparotomy is needed that given the patient's obesity she is at a much higher risk of surgical complications including infection and wound complications.    Atrial fibrillation: Currently on metoprolol.  Has appointment cardiology tomorrow and last for surgery clearance.  In the event that they recommend anticoagulation this would be reasonable to start immediately postoperatively.  In the event that cardiology does not recommend anticoagulation we will plan to discharge the patient home with 30 days of anticoagulation regardless.    Shortness of air: Patient reports that this is somewhat improved since starting metoprolol for atrial fibrillation.  Does have dyspnea on exertion but is able to ambulate unassisted.  See above for discussion about concerns for surgical positioning.  We will have her meet with anesthesia prior to surgery to ensure optimization.    Hypertension: Currently very well controlled on metoprolol and irbesartan    Pain assessment was performed today as a part of patient’s care.  For patients with pain related to surgery, gynecologic malignancy or cancer treatment, the plan is as noted in the assessment/plan.  For patients with pain not related to these issues, they are to seek any further needed care from a more appropriate provider, such as PCP.      Diagnoses and all orders for this visit:    1. Endometrial cancer (HCC) (Primary)  -     Case Request;  Standing  -     COVID PRE-OP / PRE-PROCEDURE SCREENING ORDER (NO ISOLATION) - Swab, Nasopharynx; Future  -     CBC and Differential; Future  -     Comprehensive metabolic panel; Future  -     Hemoglobin A1c; Future  -     Type and screen; Future  -     ECG 12 Lead; Future  -     XR chest 2 vw; Future  -     Case Request    2. Morbid obesity with BMI of 70 and over, adult (HCC)    3. Persistent atrial fibrillation (HCC)    4. Dyspnea on exertion    5. Primary hypertension    Other orders  -     Follow anesthesia standing orders.; Future  -     Obtain informed consent  -     Provide NPO Instructions to Patient; Future  -     Chlorhexidine Skin Prep; Future         Follow Up: Surgery    Kathie Britt MD  Gynecologic Oncology Resident    Patient was seen and examined with Dr. Britt,  resident, who performed portions of the examination and documentation for this patient's care under my direct supervision.  I agree with the above documentation and plan.    ARIES Marie MD  Gynecologic Oncology

## 2022-02-14 NOTE — PATIENT INSTRUCTIONS
"              Laparoscopic Surgery Instructions            Analisa Cortez  [id number]  1960      SURGEON:  Maria Elena Marie MD    Surgery Coordinator: Ronel   If you have any questions before or after surgery please call.  [phone]        Appointment      2. You have pre-admission testing (PAT) appointment on 02/16/2022 at 11:15 am  You will need to be at hospital registration 10 minutes before that time. The registration department is located in the long hallway between the [facility] and [address] [place] [address]. This is on the first floor of the [facility] hospital you can enter through the [address] [place] [address].      3.  Your surgery has been scheduled on 02/18/2022.  You will need to be at the [address] [place] [address] second floor surgery registration on that day at 10:00 am    The Day(s) Before Surgery     ·  Nothing by mouth after midnight on 02/17/2022    · Plan to have someone take you home from the hospital.    · Do not use any products that contain nicotine or tobacco, such as cigarettes and e-cigarettes. These can delay healing after surgery. If you need help quitting, ask your health care provider.    ·  Do not take vitamins or aspirin one week before surgery ( if applicable). Do not take Ibuprofen or NSAIDs 5 days prior to Surgery. Do not take ACE or ARB medications for blood pressure the morning of surgery. If you are taking insulin, take 1/2 dose insulin the night prior to surgery.   Bring them with you to the hospital (Diabetic patient should bring insulin if instructed by the managing physician). If you are taking a blood thinner ( Eliquis, Coumadin, Xarelto, Lovenox, Asprin, Heparin, etc.) please have the provider that manages this instruct you on when to stop taking prior to surgery.     · Continue antidepressants, Beta Blockers \"olol\", anti-seizure medication, GERD medication (heartburn), Opioids and Parkinson's medication.     · Suboxone and Phentermine needs to stopped 7 days prior to surgery.      · If you are feeling sick, " have a fever or cough and have seen your PCP let our office know 48 hours prior to surgery. It may be subject to rescheduling.         The Day of Surgery:    Do not chew gum or tobacco, smoke, or eat mints or hard candy. Shower and wash your hair. You may brush your teeth but  do not swallow water. Use any wipes that Pre-admission testing has given you.     Please arrive for surgery as instructed by the pre-op nurse, often one to two hours before your surgery.    • Remove all jewelry, including rings and piercings. Do not bring valuables to the hospital.    • Wear loose-fitting clothing.    • Avoid wearing eye makeup or contact lenses    • Please remember to bring:  ? Photo ID and medical insurance card  ? Advanced directives, living will or power of  (if applicable)  ? Current list of all medications, including over-the- counter and herbal supplements  ? List of allergies  ? CPAP device if you have sleep apnea  ? Any assistive devices or equipment needed after surgery  ? Books/magazines to pass the time    • When you arrive, check in at the surgery registration desk on the second floor of the [address] [place] [address].     • Once you are called to go to your pre-op room, no one will be allowed in the pre op room.     Please note no one under age 12 is permitted to stay in the waiting area without supervision.    We make every effort to begin surgery at your scheduled start time but delays do occur. We will keep you and your family  updated about any delays.    While You are In the Pre-Op Room:  • The nurse will review your health history and will place an IV (into the vein) in your hand or arm for fluids and  medicines.  • An anesthesia provider will talk with you about anesthesia and pain control during and after surgery.  • A member of the surgical team can answer your questions.             What can I expect after the procedure?    After Surgery and/or Discharge:    After surgery you will be moved to the recovery  area. Recovery and discharge times will vary depending on the procedure.    The recovery room nurse will provide your family/visitors with updates. Visitors are not permitted in the immediate postoperative recovery area, but your visitors will be notified when they can come to see you after surgery.    If you will not be admitted to the hospital, your nurse will assess your readiness to go home. This includes your:    • Ability to walk, use crutches, etc.  • Ability to urinate adequate amounts  • Nausea and pain control    Before discharge, you will receive written instructions about how to care for yourself at home along with any prescriptions  for medications.     For your safety, you will need a responsible adult age 18 or older to accompany you home.     Please have a ride arranged and available when you are ready to leave. You cannot leave alone and it is recommended someone stay with you for the first 24 hours after anesthesia.       After the procedure, it is common to have:    · Pain.  · Soreness and numbness in your incision areas.  · Vaginal bleeding and discharge up to 6 weeks after surgery.  · Constipation.  · Temporary change in bladder function.  · Feelings of sadness or other emotions.  · Small amounts of clear drainage from incisions  · If you are discharged with an abdominal binder, this is to be used as needed for incisional comfort. You may choose to discontinue use at any time.           Follow these instructions at home:  Medicines    · Please take any medications that have been prescribed after your surgery, you may take over the counter Tylenol and Ibuprofen, unless told otherwise by your provider.   · Please take your stool softener as prescribed. If you do not have a bowel movement within 24 hours following surgery try a laxative (milk of magnesia) or you may take Berta-Lax.    Activity    · Return to your normal activities as told by your health care provider.   · You may be told to take  short walks every day and go farther each time.  · Do not lift anything that is heavier than 20 lbs.      General instructions    · Do not put anything in your vagina for 6 weeks after your surgery or as told by your health care provider. This includes tampons and douches.  · Do not have sex until your health care provider says you can.  · Do not take baths, swim, or use a hot tub until your health care provider approves.  · Drink enough fluid to keep your urine clear or pale yellow.  · Do not drive for 24 hours if you were given a sedative.  · Do not drive while taking Narcotic Pain medication ( oxycodone, hydrocodone, etc.).   · Keep all follow-up visits as told by your health care provider. This is important. You will have a post op appointment typically 3 weeks after surgery.  · Make sure you are urinating on a scheduled basis, for example every 2 hours. This will help retrain your bladder after surgery and prevent urinary tract infections.     Contact a health care provider if:    · Your pain medicine is not helping.  · You have a fever over 101.0  · You have redness, swelling, or pain at your incision site.  · You continue to have difficulty urinating.  · You have not had a bowel movement 2-3 days after surgery, experience nausea and vomiting, are unable to pass gas.   · Large volumes of drainage or blood from incisions, requiring multiple guaze changes.     Get help right away if:    · You have severe abdominal or back pain that is not controlled with medication.  · You have heavy bleeding from your vagina that saturates a maxi pad within 1-2 hours. Note- vaginal bleeding and spotting is normal up to 6 weeks from a hysterectomy, Heavy Bleeding is not.     If you experience a medical emergency call 911 or have someone drive you to your nearest emergency department.         Parking Information:    Free parking is available for patients and guests. There is  parking at the [address] in front of the  [facility]. Parking is also available in the [facility] and [facility].             Financial Assistance:    If you have any questions or need assistance, contact your [facility] financial counseling office from 8:30 a.m.-4:30  p.m. Monday through Friday. Closed weekends.    •  [place]: [phone] or, or visit at [address] [facility], near the entrance.        Patient Payments and Correspondence    Customer service representatives are available to assist you from 8:00 a.m. to 6:00 p.m. Eastern Standard Time by calling [phone] Monday through Friday. You can also contact us through [employer].    [facility]  [id number]  [place] [address] [phone]

## 2022-02-14 NOTE — H&P (VIEW-ONLY)
New Patient Office Visit      Patient Name: Analisa Cortez  : 1960   MRN: [id number]     Referring Physician: Sonia Mayo PA-C / Dr. Estella Pandey    Chief Complaint:    Chief Complaint   Patient presents with   • Consult     Endometrial Cancer       History of Present Illness: Analisa Cortez is a 61 y.o. female who is here today as a consultation with gynecologic oncology.  She started having postmenopausal bleeding in 10/2021. She had a UTI at that time and originally she thought that was related to the bleeding. However, she continued to have a few episodes of spotting and some small blood clots in November and December. She went to see Dr. Pandey in January and she had an US that showed an endometrial thickness of 29 mm and underwent hysteroscopy D&C on 22. Pathology report for that showed FIGO grade 2 endometrioid adenocarcinoma. Since surgery, she reports that she is doing well and has not had any more vaginal bleeding. She was recently diagnosed with atrial fibrillation and is going to see cardiology tomorrow.     Oncologic History:  Oncology/Hematology History   Endometrial cancer (HCC)   2022 Initial Diagnosis    Referred by LATONIA Leung/Dr. Etsella Pandey    Patient presented with postmenopausal bleeding    2022: TVUS with uterus with endometrial stripe thickness of 29 mm.  Unable to visualize either ovary.  No obvious adnexal masses.  Scant free fluid.  2022: Hysteroscopy with D&C with well to moderately differentiated endometrioid adenocarcinoma          Past Medical History:   Past Medical History:   Diagnosis Date   • Arthritis    • Atrial fibrillation (HCC)    • Frequent UTI    • Heart rate fast     at times   • Hypertension    • Seasonal allergies    • Wears glasses     for reading       Past Surgical History:   Past Surgical History:   Procedure Laterality Date   • DILATATION AND CURETTAGE N/A 2022    Procedure: DILATATION AND CURETTAGE;  Surgeon: Estella Pandey,  MD;  Location: [facility];  Service: Obstetrics/Gynecology;  Laterality: N/A;   • NO PAST SURGERIES         Family History:   Family History   Problem Relation Age of Onset   • No Known Problems Father    • Osteoporosis Mother    • Arthritis Mother    • Cancer Maternal Aunt         Bladder   • Breast cancer Cousin    • Breast cancer Cousin    • Ovarian cancer Neg Hx    • Uterine cancer Neg Hx    • Colon cancer Neg Hx    • Melanoma Neg Hx    • Prostate cancer Neg Hx        Social History:   Social History     Socioeconomic History   • Marital status:    Tobacco Use   • Smoking status: Never Smoker   • Smokeless tobacco: Never Used   Vaping Use   • Vaping Use: Never used   Substance and Sexual Activity   • Alcohol use: Never   • Drug use: Never   • Sexual activity: Not Currently     Birth control/protection: Post-menopausal     Comment:          Past OB/GYN History:   OB History    Para Term  AB Living      SAB IAB Ectopic Molar Multiple Live Births                    # Outcome Date GA Lbr Carmelo/2nd Weight Sex Delivery Anes PTL Lv   1 Term                 Health Maintenance:   Mammogram: Date: ? Results: Reportedly normal  Colonoscopy: Date: Never done Results: N/A    Medications:     Current Outpatient Medications:   •  acetaminophen (TYLENOL) 500 MG tablet, Take 2 tablets by mouth Every 8 (Eight) Hours As Needed for Mild Pain  or Moderate Pain ., Disp: 60 tablet, Rfl: 0  •  aspirin 81 MG EC tablet, Take 81 mg by mouth Daily., Disp: , Rfl:   •  furosemide (LASIX) 20 MG tablet, Take 20 mg by mouth 2 (Two) Times a Day., Disp: , Rfl:   •  irbesartan (AVAPRO) 150 MG tablet, Take 150 mg by mouth Daily., Disp: , Rfl:   •  metoprolol tartrate (LOPRESSOR) 25 MG tablet, Take 25 mg by mouth 2 (Two) Times a Day., Disp: , Rfl:   •  potassium chloride (K-DUR,KLOR-CON) 20 MEQ CR tablet, Take 20 mEq by mouth Daily., Disp: , Rfl:   •  vitamin C (ASCORBIC ACID) 250 MG tablet, Take  "250 mg by mouth Daily., Disp: , Rfl:     Allergies:   No Known Allergies    Review of Systems:   Review of Systems   Constitutional: Negative for appetite change, chills, fever and unexpected weight change.   HENT: Negative for sneezing and sore throat.    Respiratory: Positive for shortness of breath.    Cardiovascular: Positive for palpitations. Negative for chest pain and leg swelling.   Gastrointestinal: Negative for abdominal distention, abdominal pain, constipation, diarrhea, nausea and vomiting.   Genitourinary: Negative for difficulty urinating, dyspareunia, dysuria, vaginal bleeding, vaginal discharge and vaginal pain.   Musculoskeletal: Positive for back pain and joint swelling.   Neurological: Negative for light-headedness and headaches.        Objective     Physical Exam:  Vital Signs:   Vitals:    02/14/22 1324   BP: 129/95  Comment: Right Wrist   Pulse: 72   Resp: 20   Temp: 96.9 °F (36.1 °C)   TempSrc: Infrared   SpO2: 99%  Comment: RA   Weight: (!) 155 kg (341 lb)   Height: 148.6 cm (58.5\")   PainSc: 0-No pain     BMI: Body mass index is 70.06 kg/m².   ECOG score: 0           PHQ-2 Depression Screening  Little interest or pleasure in doing things? 0   Feeling down, depressed, or hopeless? 0   PHQ-2 Total Score 0     Physical Exam  Vitals and nursing note reviewed. Exam conducted with a chaperone present.   Constitutional:       General: She is not in acute distress.     Appearance: Normal appearance. She is well-developed. She is obese. She is not ill-appearing or diaphoretic.   HENT:      Head: Normocephalic and atraumatic.      Right Ear: External ear normal.      Left Ear: External ear normal.      Mouth/Throat:      Comments: Mask in place.   Eyes:      General: No scleral icterus.        Right eye: No discharge.         Left eye: No discharge.      Conjunctiva/sclera: Conjunctivae normal.   Neck:      Thyroid: No thyromegaly.   Cardiovascular:      Rate and Rhythm: Normal rate. Rhythm irregular. "      Heart sounds: Normal heart sounds. No murmur heard.      Pulmonary:      Effort: Pulmonary effort is normal. No respiratory distress.      Breath sounds: Normal breath sounds. No wheezing.   Abdominal:      General: There is no distension.      Palpations: Abdomen is soft. There is no mass.      Tenderness: There is no abdominal tenderness. There is no guarding.      Hernia: No hernia is present.      Comments: Exam limited by patient's body habitus.    Genitourinary:     Exam position: Lithotomy position.      Labia:         Right: No lesion.         Left: No lesion.       Vagina: Normal. No vaginal discharge or lesions.      Cervix: No lesion or cervical bleeding.      Comments: Uterus and adnexa unable to be palpated due to patient's body habitus  Musculoskeletal:         General: No swelling, tenderness, deformity or signs of injury. Normal range of motion.      Cervical back: Normal range of motion and neck supple.      Right lower leg: No edema.      Left lower leg: No edema.   Lymphadenopathy:      Cervical: No cervical adenopathy.   Skin:     General: Skin is warm and dry.      Findings: No erythema, lesion or rash.   Neurological:      General: No focal deficit present.      Mental Status: She is alert and oriented to person, place, and time. Mental status is at baseline.   Psychiatric:         Mood and Affect: Mood normal.         Behavior: Behavior normal.         Thought Content: Thought content normal.       Imaging: Images reviewed directly by me. Agree with impression.  1/27/2022: TVUS with uterus with endometrial stripe thickness of 29 mm.  Unable to visualize either ovary.  No obvious adnexal masses.  Scant free fluid.    Pathology:  1/31/2022: Hysteroscopy with D&C with well to moderately differentiated endometrioid adenocarcinoma  Assessment / Plan    Analisa Cortez is a 61 y.o. female who is recently diagnosed with FIGO grade 2 endometrial cancer.  While surgery is typically the cornerstone  of management for endometrial cancer we did discuss alternatives of treatment especially in light of the patient's super morbid obesity.  We discussed the alternatives of use of hormonal management with IUD placement as well as radiation.  I am concerned about the ability to adequately dose patient given her body habitus leading to a much higher risk of progression or recurrence of disease.  We also discussed that given the grade 2 findings that I am concerned that she would feel hormonal management.  After an extensive discussion including discussion of surgical risk (see below) the patient would like to proceed with attempted surgical management.    We discussed surgical management via robotic hysterectomy, bilateral salpingo-oophorectomy with sentinel lymph node evaluation +/- complete lymphadenectomy as indicated.  The patient has minimal descensus on exam today and it would not be a good candidate for vaginal hysterectomy.The patient was also counseled extensively on the nature of endometrial cancer and the fact that a majority are found in the early stages. However, if more advanced disease is encountered, she may require chemotherapy, radiation or a combination of the two. We also discussed risks of the procedure including bleeding, infection, wound breakdown, blood clots, injury to surrounding organs including the bowel, bladder, blood vessels, nerves, and ureters requiring additional intervention or procedures. We also discussed the risk of development of lymphedema particularly if a complete lymphadenectomy is needed. and the need for a laparotomy if the surgery cannot be performed safely via the minimally invasive approach. We also discussed the anticipated hospital stay and recovery time. All of the patient's questions were answered satisfactorily and verbal consent was obtained. Labs, EKG, CXR and COVID test ordered as below.    Morbid obesity (current Body mass index is 70.06 kg/m².): I counseled her  that excess body weight is associated with the development of many health issues (including cancer and heart disease) and increases the risk of surgical complications. Today, we reviewed in extensive detail the impact of obesity on the ability to perform robotic surgery. We discussed the risk of inability to ventilate the patient adequately in the required steep Trendelenburg position.  In the event that we are unable to do this we would need to perform an exploratory laparotomy for completion of surgery.  We discussed that in the event that laparotomy is needed that given the patient's obesity she is at a much higher risk of surgical complications including infection and wound complications.    Atrial fibrillation: Currently on metoprolol.  Has appointment cardiology tomorrow and last for surgery clearance.  In the event that they recommend anticoagulation this would be reasonable to start immediately postoperatively.  In the event that cardiology does not recommend anticoagulation we will plan to discharge the patient home with 30 days of anticoagulation regardless.    Shortness of air: Patient reports that this is somewhat improved since starting metoprolol for atrial fibrillation.  Does have dyspnea on exertion but is able to ambulate unassisted.  See above for discussion about concerns for surgical positioning.  We will have her meet with anesthesia prior to surgery to ensure optimization.    Hypertension: Currently very well controlled on metoprolol and irbesartan    Pain assessment was performed today as a part of patient’s care.  For patients with pain related to surgery, gynecologic malignancy or cancer treatment, the plan is as noted in the assessment/plan.  For patients with pain not related to these issues, they are to seek any further needed care from a more appropriate provider, such as PCP.      Diagnoses and all orders for this visit:    1. Endometrial cancer (HCC) (Primary)  -     Case Request;  Standing  -     COVID PRE-OP / PRE-PROCEDURE SCREENING ORDER (NO ISOLATION) - Swab, Nasopharynx; Future  -     CBC and Differential; Future  -     Comprehensive metabolic panel; Future  -     Hemoglobin A1c; Future  -     Type and screen; Future  -     ECG 12 Lead; Future  -     XR chest 2 vw; Future  -     Case Request    2. Morbid obesity with BMI of 70 and over, adult (HCC)    3. Persistent atrial fibrillation (HCC)    4. Dyspnea on exertion    5. Primary hypertension    Other orders  -     Follow anesthesia standing orders.; Future  -     Obtain informed consent  -     Provide NPO Instructions to Patient; Future  -     Chlorhexidine Skin Prep; Future         Follow Up: Surgery    Kathie Britt MD  Gynecologic Oncology Resident    Patient was seen and examined with Dr. Britt,  resident, who performed portions of the examination and documentation for this patient's care under my direct supervision.  I agree with the above documentation and plan.    AIRES Marie MD  Gynecologic Oncology

## 2022-02-15 ENCOUNTER — OFFICE VISIT (OUTPATIENT)
Dept: CARDIOLOGY | Facility: CLINIC | Age: 62
End: 2022-02-15

## 2022-02-15 VITALS
SYSTOLIC BLOOD PRESSURE: 140 MMHG | HEART RATE: 102 BPM | HEIGHT: 59 IN | WEIGHT: 293 LBS | BODY MASS INDEX: 59.07 KG/M2 | DIASTOLIC BLOOD PRESSURE: 76 MMHG | OXYGEN SATURATION: 99 %

## 2022-02-15 DIAGNOSIS — I48.91 ATRIAL FIBRILLATION WITH RVR: Primary | ICD-10-CM

## 2022-02-15 DIAGNOSIS — C54.1 ENDOMETRIAL CANCER: ICD-10-CM

## 2022-02-15 DIAGNOSIS — E66.01 MORBID OBESITY WITH BMI OF 70 AND OVER, ADULT: ICD-10-CM

## 2022-02-15 DIAGNOSIS — I10 PRIMARY HYPERTENSION: ICD-10-CM

## 2022-02-15 PROCEDURE — 99203 OFFICE O/P NEW LOW 30 MIN: CPT | Performed by: INTERNAL MEDICINE

## 2022-02-15 RX ORDER — IBUPROFEN 800 MG/1
800 TABLET ORAL EVERY 6 HOURS PRN
COMMUNITY
End: 2022-02-18 | Stop reason: HOSPADM

## 2022-02-15 RX ORDER — METOPROLOL TARTRATE 50 MG/1
50 TABLET, FILM COATED ORAL 2 TIMES DAILY
Qty: 180 TABLET | Refills: 3 | Status: SHIPPED | OUTPATIENT
Start: 2022-02-15 | End: 2022-03-24 | Stop reason: HOSPADM

## 2022-02-16 ENCOUNTER — HOSPITAL ENCOUNTER (OUTPATIENT)
Dept: GENERAL RADIOLOGY | Facility: HOSPITAL | Age: 62
Discharge: HOME | End: 2022-02-16

## 2022-02-16 ENCOUNTER — PRE-ADMISSION TESTING (OUTPATIENT)
Dept: PREADMISSION TESTING | Facility: HOSPITAL | Age: 62
End: 2022-02-16

## 2022-02-16 VITALS — WEIGHT: 293 LBS | HEIGHT: 59 IN | BODY MASS INDEX: 59.07 KG/M2

## 2022-02-16 DIAGNOSIS — C54.1 ENDOMETRIAL CANCER: ICD-10-CM

## 2022-02-16 LAB
ABO GROUP BLD: NORMAL
ALBUMIN SERPL-MCNC: 4.3 G/DL (ref 3.5–5.2)
ALBUMIN/GLOB SERPL: 1.3 G/DL
ALP SERPL-CCNC: 73 U/L (ref 39–117)
ALT SERPL W P-5'-P-CCNC: 19 U/L (ref 1–33)
ANION GAP SERPL CALCULATED.3IONS-SCNC: 13 MMOL/L (ref 5–15)
AST SERPL-CCNC: 14 U/L (ref 1–32)
BASOPHILS # BLD AUTO: 0.02 10*3/MM3 (ref 0–0.2)
BASOPHILS NFR BLD AUTO: 0.4 % (ref 0–1.5)
BILIRUB SERPL-MCNC: 0.9 MG/DL (ref 0–1.2)
BLD GP AB SCN SERPL QL: NEGATIVE
BUN SERPL-MCNC: 27 MG/DL (ref 8–23)
BUN/CREAT SERPL: 20.5 (ref 7–25)
CALCIUM SPEC-SCNC: 9.7 MG/DL (ref 8.6–10.5)
CHLORIDE SERPL-SCNC: 106 MMOL/L (ref 98–107)
CO2 SERPL-SCNC: 23 MMOL/L (ref 22–29)
CREAT SERPL-MCNC: 1.32 MG/DL (ref 0.57–1)
DEPRECATED RDW RBC AUTO: 45.1 FL (ref 37–54)
EOSINOPHIL # BLD AUTO: 0.11 10*3/MM3 (ref 0–0.4)
EOSINOPHIL NFR BLD AUTO: 2.2 % (ref 0.3–6.2)
ERYTHROCYTE [DISTWIDTH] IN BLOOD BY AUTOMATED COUNT: 12.9 % (ref 12.3–15.4)
GFR SERPL CREATININE-BSD FRML MDRD: 41 ML/MIN/1.73
GLOBULIN UR ELPH-MCNC: 3.4 GM/DL
GLUCOSE SERPL-MCNC: 118 MG/DL (ref 65–99)
HBA1C MFR BLD: 5.7 % (ref 4.8–5.6)
HCT VFR BLD AUTO: 40.7 % (ref 34–46.6)
HGB BLD-MCNC: 12.8 G/DL (ref 12–15.9)
IMM GRANULOCYTES # BLD AUTO: 0 10*3/MM3 (ref 0–0.05)
IMM GRANULOCYTES NFR BLD AUTO: 0 % (ref 0–0.5)
LYMPHOCYTES # BLD AUTO: 0.57 10*3/MM3 (ref 0.7–3.1)
LYMPHOCYTES NFR BLD AUTO: 11.5 % (ref 19.6–45.3)
MCH RBC QN AUTO: 29.8 PG (ref 26.6–33)
MCHC RBC AUTO-ENTMCNC: 31.4 G/DL (ref 31.5–35.7)
MCV RBC AUTO: 94.7 FL (ref 79–97)
MONOCYTES # BLD AUTO: 0.56 10*3/MM3 (ref 0.1–0.9)
MONOCYTES NFR BLD AUTO: 11.3 % (ref 5–12)
NEUTROPHILS NFR BLD AUTO: 3.69 10*3/MM3 (ref 1.7–7)
NEUTROPHILS NFR BLD AUTO: 74.6 % (ref 42.7–76)
NRBC BLD AUTO-RTO: 0 /100 WBC (ref 0–0.2)
PLATELET # BLD AUTO: 201 10*3/MM3 (ref 140–450)
PMV BLD AUTO: 11.5 FL (ref 6–12)
POTASSIUM SERPL-SCNC: 4.7 MMOL/L (ref 3.5–5.2)
PROT SERPL-MCNC: 7.7 G/DL (ref 6–8.5)
QT INTERVAL: 382 MS
QTC INTERVAL: 553 MS
RBC # BLD AUTO: 4.3 10*6/MM3 (ref 3.77–5.28)
RH BLD: POSITIVE
SARS-COV-2 RNA PNL SPEC NAA+PROBE: NOT DETECTED
SODIUM SERPL-SCNC: 142 MMOL/L (ref 136–145)
T&S EXPIRATION DATE: NORMAL
WBC NRBC COR # BLD: 4.95 10*3/MM3 (ref 3.4–10.8)

## 2022-02-16 PROCEDURE — 86901 BLOOD TYPING SEROLOGIC RH(D): CPT

## 2022-02-16 PROCEDURE — 71046 X-RAY EXAM CHEST 2 VIEWS: CPT

## 2022-02-16 PROCEDURE — 85025 COMPLETE CBC W/AUTO DIFF WBC: CPT

## 2022-02-16 PROCEDURE — 93005 ELECTROCARDIOGRAM TRACING: CPT

## 2022-02-16 PROCEDURE — C9803 HOPD COVID-19 SPEC COLLECT: HCPCS

## 2022-02-16 PROCEDURE — U0004 COV-19 TEST NON-CDC HGH THRU: HCPCS

## 2022-02-16 PROCEDURE — 36415 COLL VENOUS BLD VENIPUNCTURE: CPT

## 2022-02-16 PROCEDURE — 86900 BLOOD TYPING SEROLOGIC ABO: CPT

## 2022-02-16 PROCEDURE — 93010 ELECTROCARDIOGRAM REPORT: CPT | Performed by: INTERNAL MEDICINE

## 2022-02-16 PROCEDURE — 80053 COMPREHEN METABOLIC PANEL: CPT

## 2022-02-16 PROCEDURE — 86850 RBC ANTIBODY SCREEN: CPT

## 2022-02-16 PROCEDURE — 83036 HEMOGLOBIN GLYCOSYLATED A1C: CPT

## 2022-02-16 NOTE — PAT
An arrival time for procedure was not given during PAT visit. If patient had any questions or concerns about their arrival time, they were instructed to contact their surgeon/physician.  Additionally, if the patient referred to an arrival time that was acquired from their my chart account, patient was encouraged to verify that time with their surgeon/physician.  NO arrival times given in Pre Admission Testing Department.    Per Anesthesia Request, patient instructed not to take their ACE/ARB medications on the AM of surgery.    Patient to apply Chlorhexadine wipes  to surgical area (as instructed) the night before procedure and the AM of procedure. Wipes provided.    Patient viewed general PAT education video as instructed in their preoperative information received from their surgeon.  Patient stated the general PAT education video was viewed in its entirety and survey completed.  Copies of PAT general education handouts (Incentive Spirometry, Meds to Beds Program, Patient Belongings, Pre-op skin preparation instructions, Blood Glucose testing, Visitor policy, Surgery FAQ, Code H) distributed to patient if not printed. Education related to the PAT pass and skin preparation for surgery (if applicable) completed in PAT as a reinforcement to PAT education video. Patient instructed to return PAT pass provided today as well as completed skin preparation sheet (if applicable) on the day of procedure.     Additionally if patient had not viewed video yet but intended to view it at home or in our waiting area, then referred them to the handout with QR code/link provided during PAT visit.  Instructed patient to complete survey after viewing the video in its entirety.  Encouraged patient/family to read PAT general education handouts thoroughly and notify PAT staff with any questions or concerns. Patient verbalized understanding of all information and priority content.    Patient was seen in Dr. Davidson's office yesterday for  surgical clearance due to recent episode of a-fib with RVR.  No waveform was found in chart.  This RN called Dr. Davidson's nurse for a waveform to be faxed to PAT but call was not returned within appointment timeframe.  A new EKG was obtained today with automatic interpretation of a-fib with RVR at 128 bpm.  Dr. Davidson's note form yesterday's visit states patient is cleared for surgery.  Patient denies chest pain or SOA since seeing Dr. Davidson.  This RN notified anesthesia on call Dr. Mcclellan who stated patient was okay to proceed without further testing.      Dr. Davidson's note and PAT EKG can be found in paper chart and Epic.    COVID test performed in PAT.    Patient directed to Radiology Department for CXR after Pre Admission Testing Appointment.

## 2022-02-17 ENCOUNTER — ANESTHESIA EVENT (OUTPATIENT)
Dept: PERIOP | Facility: HOSPITAL | Age: 62
End: 2022-02-17

## 2022-02-17 RX ORDER — FAMOTIDINE 10 MG/ML
20 INJECTION, SOLUTION INTRAVENOUS ONCE
Status: CANCELLED | OUTPATIENT
Start: 2022-02-17 | End: 2022-02-17

## 2022-02-18 ENCOUNTER — ANESTHESIA (OUTPATIENT)
Dept: PERIOP | Facility: HOSPITAL | Age: 62
End: 2022-02-18

## 2022-02-18 ENCOUNTER — HOSPITAL ENCOUNTER (OUTPATIENT)
Facility: HOSPITAL | Age: 62
Discharge: HOME | End: 2022-02-18
Attending: OBSTETRICS & GYNECOLOGY | Admitting: OBSTETRICS & GYNECOLOGY

## 2022-02-18 VITALS
HEIGHT: 59 IN | WEIGHT: 293 LBS | TEMPERATURE: 97 F | DIASTOLIC BLOOD PRESSURE: 72 MMHG | SYSTOLIC BLOOD PRESSURE: 106 MMHG | RESPIRATION RATE: 15 BRPM | BODY MASS INDEX: 59.07 KG/M2 | HEART RATE: 118 BPM | OXYGEN SATURATION: 96 %

## 2022-02-18 DIAGNOSIS — C54.1 ENDOMETRIAL CANCER: ICD-10-CM

## 2022-02-18 LAB
ABO GROUP BLD: NORMAL
QT INTERVAL: 338 MS
QTC INTERVAL: 479 MS
RH BLD: POSITIVE

## 2022-02-18 PROCEDURE — 88341 IMHCHEM/IMCYTCHM EA ADD ANTB: CPT | Performed by: OBSTETRICS & GYNECOLOGY

## 2022-02-18 PROCEDURE — 25010000002 NEOSTIGMINE 10 MG/10ML SOLUTION: Performed by: NURSE ANESTHETIST, CERTIFIED REGISTERED

## 2022-02-18 PROCEDURE — 93010 ELECTROCARDIOGRAM REPORT: CPT | Performed by: STUDENT IN AN ORGANIZED HEALTH CARE EDUCATION/TRAINING PROGRAM

## 2022-02-18 PROCEDURE — 25010000002 ONDANSETRON PER 1 MG: Performed by: NURSE ANESTHETIST, CERTIFIED REGISTERED

## 2022-02-18 PROCEDURE — 25010000002 FENTANYL CITRATE (PF) 100 MCG/2ML SOLUTION: Performed by: NURSE ANESTHETIST, CERTIFIED REGISTERED

## 2022-02-18 PROCEDURE — 38571 LAPAROSCOPY LYMPHADENECTOMY: CPT | Performed by: PHYSICIAN ASSISTANT

## 2022-02-18 PROCEDURE — 25010000002 DEXAMETHASONE PER 1 MG: Performed by: NURSE ANESTHETIST, CERTIFIED REGISTERED

## 2022-02-18 PROCEDURE — 88307 TISSUE EXAM BY PATHOLOGIST: CPT | Performed by: OBSTETRICS & GYNECOLOGY

## 2022-02-18 PROCEDURE — 88309 TISSUE EXAM BY PATHOLOGIST: CPT | Performed by: OBSTETRICS & GYNECOLOGY

## 2022-02-18 PROCEDURE — 25010000002 FENTANYL CITRATE (PF) 50 MCG/ML SOLUTION

## 2022-02-18 PROCEDURE — 25010000002 HEPARIN (PORCINE) PER 1000 UNITS: Performed by: OBSTETRICS & GYNECOLOGY

## 2022-02-18 PROCEDURE — 88342 IMHCHEM/IMCYTCHM 1ST ANTB: CPT | Performed by: OBSTETRICS & GYNECOLOGY

## 2022-02-18 PROCEDURE — 93005 ELECTROCARDIOGRAM TRACING: CPT | Performed by: ANESTHESIOLOGY

## 2022-02-18 PROCEDURE — 38571 LAPAROSCOPY LYMPHADENECTOMY: CPT | Performed by: OBSTETRICS & GYNECOLOGY

## 2022-02-18 PROCEDURE — 25010000002 PROPOFOL 10 MG/ML EMULSION: Performed by: NURSE ANESTHETIST, CERTIFIED REGISTERED

## 2022-02-18 PROCEDURE — 86900 BLOOD TYPING SEROLOGIC ABO: CPT

## 2022-02-18 PROCEDURE — 86901 BLOOD TYPING SEROLOGIC RH(D): CPT

## 2022-02-18 PROCEDURE — 58571 TLH W/T/O 250 G OR LESS: CPT | Performed by: PHYSICIAN ASSISTANT

## 2022-02-18 PROCEDURE — C1889 IMPLANT/INSERT DEVICE, NOC: HCPCS | Performed by: OBSTETRICS & GYNECOLOGY

## 2022-02-18 PROCEDURE — 38900 IO MAP OF SENT LYMPH NODE: CPT | Performed by: OBSTETRICS & GYNECOLOGY

## 2022-02-18 PROCEDURE — 25010000002 SUCCINYLCHOLINE PER 20 MG: Performed by: NURSE ANESTHETIST, CERTIFIED REGISTERED

## 2022-02-18 PROCEDURE — 25010000002 FENTANYL CITRATE (PF) 50 MCG/ML SOLUTION: Performed by: NURSE ANESTHETIST, CERTIFIED REGISTERED

## 2022-02-18 PROCEDURE — 58571 TLH W/T/O 250 G OR LESS: CPT | Performed by: OBSTETRICS & GYNECOLOGY

## 2022-02-18 DEVICE — FLOSEAL HEMOSTATIC MATRIX, 10ML
Type: IMPLANTABLE DEVICE | Site: ABDOMEN | Status: FUNCTIONAL
Brand: FLOSEAL HEMOSTATIC MATRIX

## 2022-02-18 RX ORDER — MAGNESIUM HYDROXIDE 1200 MG/15ML
LIQUID ORAL AS NEEDED
Status: DISCONTINUED | OUTPATIENT
Start: 2022-02-18 | End: 2022-02-18 | Stop reason: HOSPADM

## 2022-02-18 RX ORDER — ACETAMINOPHEN 325 MG/1
650 TABLET ORAL EVERY 4 HOURS PRN
Qty: 60 TABLET | Refills: 0 | Status: SHIPPED | OUTPATIENT
Start: 2022-02-18 | End: 2023-03-07

## 2022-02-18 RX ORDER — SODIUM CHLORIDE 9 MG/ML
9 INJECTION, SOLUTION INTRAVENOUS CONTINUOUS
Status: DISCONTINUED | OUTPATIENT
Start: 2022-02-18 | End: 2022-02-18 | Stop reason: HOSPADM

## 2022-02-18 RX ORDER — GABAPENTIN 300 MG/1
300 CAPSULE ORAL ONCE
Status: COMPLETED | OUTPATIENT
Start: 2022-02-18 | End: 2022-02-18

## 2022-02-18 RX ORDER — DOCUSATE SODIUM 250 MG
250 CAPSULE ORAL 2 TIMES DAILY
Qty: 60 CAPSULE | Refills: 0 | Status: SHIPPED | OUTPATIENT
Start: 2022-02-18 | End: 2022-02-18 | Stop reason: SDUPTHER

## 2022-02-18 RX ORDER — PROPOFOL 10 MG/ML
VIAL (ML) INTRAVENOUS AS NEEDED
Status: DISCONTINUED | OUTPATIENT
Start: 2022-02-18 | End: 2022-02-18 | Stop reason: SURG

## 2022-02-18 RX ORDER — SODIUM CHLORIDE 9 MG/ML
INJECTION, SOLUTION INTRAVENOUS AS NEEDED
Status: DISCONTINUED | OUTPATIENT
Start: 2022-02-18 | End: 2022-02-18 | Stop reason: HOSPADM

## 2022-02-18 RX ORDER — SODIUM CHLORIDE 0.9 % (FLUSH) 0.9 %
10 SYRINGE (ML) INJECTION AS NEEDED
Status: DISCONTINUED | OUTPATIENT
Start: 2022-02-18 | End: 2022-02-18 | Stop reason: HOSPADM

## 2022-02-18 RX ORDER — PROMETHAZINE HYDROCHLORIDE 12.5 MG/1
12.5 TABLET ORAL ONCE AS NEEDED
Status: DISCONTINUED | OUTPATIENT
Start: 2022-02-18 | End: 2022-02-18 | Stop reason: HOSPADM

## 2022-02-18 RX ORDER — ONDANSETRON 4 MG/1
4 TABLET, FILM COATED ORAL ONCE AS NEEDED
Status: DISCONTINUED | OUTPATIENT
Start: 2022-02-18 | End: 2022-02-18 | Stop reason: HOSPADM

## 2022-02-18 RX ORDER — MIDAZOLAM HYDROCHLORIDE 1 MG/ML
1 INJECTION INTRAMUSCULAR; INTRAVENOUS
Status: DISCONTINUED | OUTPATIENT
Start: 2022-02-18 | End: 2022-02-18 | Stop reason: HOSPADM

## 2022-02-18 RX ORDER — ONDANSETRON 2 MG/ML
INJECTION INTRAMUSCULAR; INTRAVENOUS AS NEEDED
Status: DISCONTINUED | OUTPATIENT
Start: 2022-02-18 | End: 2022-02-18 | Stop reason: SURG

## 2022-02-18 RX ORDER — IBUPROFEN 600 MG/1
TABLET ORAL
Status: COMPLETED
Start: 2022-02-18 | End: 2022-02-18

## 2022-02-18 RX ORDER — ROCURONIUM BROMIDE 10 MG/ML
INJECTION, SOLUTION INTRAVENOUS AS NEEDED
Status: DISCONTINUED | OUTPATIENT
Start: 2022-02-18 | End: 2022-02-18 | Stop reason: SURG

## 2022-02-18 RX ORDER — OXYCODONE HYDROCHLORIDE AND ACETAMINOPHEN 5; 325 MG/1; MG/1
1-2 TABLET ORAL EVERY 4 HOURS PRN
Qty: 10 TABLET | Refills: 0 | Status: SHIPPED | OUTPATIENT
Start: 2022-02-18 | End: 2022-03-10

## 2022-02-18 RX ORDER — HEPARIN SODIUM 5000 [USP'U]/ML
5000 INJECTION, SOLUTION INTRAVENOUS; SUBCUTANEOUS ONCE
Status: COMPLETED | OUTPATIENT
Start: 2022-02-18 | End: 2022-02-18

## 2022-02-18 RX ORDER — CEFAZOLIN SODIUM IN 0.9 % NACL 3 G/100 ML
3 INTRAVENOUS SOLUTION, PIGGYBACK (ML) INTRAVENOUS ONCE
Status: COMPLETED | OUTPATIENT
Start: 2022-02-18 | End: 2022-02-18

## 2022-02-18 RX ORDER — FENTANYL CITRATE 50 UG/ML
50 INJECTION, SOLUTION INTRAMUSCULAR; INTRAVENOUS
Status: DISCONTINUED | OUTPATIENT
Start: 2022-02-18 | End: 2022-02-18 | Stop reason: HOSPADM

## 2022-02-18 RX ORDER — ACETAMINOPHEN 500 MG
1000 TABLET ORAL ONCE
Status: COMPLETED | OUTPATIENT
Start: 2022-02-18 | End: 2022-02-18

## 2022-02-18 RX ORDER — ESMOLOL HYDROCHLORIDE 10 MG/ML
INJECTION INTRAVENOUS AS NEEDED
Status: DISCONTINUED | OUTPATIENT
Start: 2022-02-18 | End: 2022-02-18 | Stop reason: SURG

## 2022-02-18 RX ORDER — SODIUM CHLORIDE 0.9 % (FLUSH) 0.9 %
10 SYRINGE (ML) INJECTION EVERY 12 HOURS SCHEDULED
Status: DISCONTINUED | OUTPATIENT
Start: 2022-02-18 | End: 2022-02-18 | Stop reason: HOSPADM

## 2022-02-18 RX ORDER — OXYCODONE HYDROCHLORIDE AND ACETAMINOPHEN 5; 325 MG/1; MG/1
1-2 TABLET ORAL EVERY 4 HOURS PRN
Qty: 10 TABLET | Refills: 0 | Status: SHIPPED | OUTPATIENT
Start: 2022-02-18 | End: 2022-02-18 | Stop reason: SDUPTHER

## 2022-02-18 RX ORDER — SODIUM CHLORIDE, SODIUM LACTATE, POTASSIUM CHLORIDE, CALCIUM CHLORIDE 600; 310; 30; 20 MG/100ML; MG/100ML; MG/100ML; MG/100ML
9 INJECTION, SOLUTION INTRAVENOUS CONTINUOUS
Status: DISCONTINUED | OUTPATIENT
Start: 2022-02-18 | End: 2022-02-18 | Stop reason: HOSPADM

## 2022-02-18 RX ORDER — DEXAMETHASONE SODIUM PHOSPHATE 4 MG/ML
INJECTION, SOLUTION INTRA-ARTICULAR; INTRALESIONAL; INTRAMUSCULAR; INTRAVENOUS; SOFT TISSUE AS NEEDED
Status: DISCONTINUED | OUTPATIENT
Start: 2022-02-18 | End: 2022-02-18 | Stop reason: SURG

## 2022-02-18 RX ORDER — IBUPROFEN 600 MG/1
600 TABLET ORAL EVERY 6 HOURS PRN
Status: DISCONTINUED | OUTPATIENT
Start: 2022-02-18 | End: 2022-02-18 | Stop reason: HOSPADM

## 2022-02-18 RX ORDER — FENTANYL CITRATE 50 UG/ML
INJECTION, SOLUTION INTRAMUSCULAR; INTRAVENOUS AS NEEDED
Status: DISCONTINUED | OUTPATIENT
Start: 2022-02-18 | End: 2022-02-18 | Stop reason: SURG

## 2022-02-18 RX ORDER — LIDOCAINE HYDROCHLORIDE 10 MG/ML
0.5 INJECTION, SOLUTION EPIDURAL; INFILTRATION; INTRACAUDAL; PERINEURAL ONCE AS NEEDED
Status: COMPLETED | OUTPATIENT
Start: 2022-02-18 | End: 2022-02-18

## 2022-02-18 RX ORDER — FENTANYL CITRATE 50 UG/ML
INJECTION, SOLUTION INTRAMUSCULAR; INTRAVENOUS
Status: COMPLETED
Start: 2022-02-18 | End: 2022-02-18

## 2022-02-18 RX ORDER — GLYCOPYRROLATE 0.2 MG/ML
INJECTION INTRAMUSCULAR; INTRAVENOUS AS NEEDED
Status: DISCONTINUED | OUTPATIENT
Start: 2022-02-18 | End: 2022-02-18 | Stop reason: SURG

## 2022-02-18 RX ORDER — PROMETHAZINE HYDROCHLORIDE 12.5 MG/1
12.5 SUPPOSITORY RECTAL ONCE AS NEEDED
Status: DISCONTINUED | OUTPATIENT
Start: 2022-02-18 | End: 2022-02-18 | Stop reason: HOSPADM

## 2022-02-18 RX ORDER — ONDANSETRON 2 MG/ML
4 INJECTION INTRAMUSCULAR; INTRAVENOUS ONCE AS NEEDED
Status: DISCONTINUED | OUTPATIENT
Start: 2022-02-18 | End: 2022-02-18 | Stop reason: HOSPADM

## 2022-02-18 RX ORDER — FAMOTIDINE 20 MG/1
20 TABLET, FILM COATED ORAL ONCE
Status: COMPLETED | OUTPATIENT
Start: 2022-02-18 | End: 2022-02-18

## 2022-02-18 RX ORDER — INDOCYANINE GREEN AND WATER 25 MG
KIT INJECTION AS NEEDED
Status: DISCONTINUED | OUTPATIENT
Start: 2022-02-18 | End: 2022-02-18 | Stop reason: HOSPADM

## 2022-02-18 RX ORDER — NEOSTIGMINE METHYLSULFATE 1 MG/ML
INJECTION, SOLUTION INTRAVENOUS AS NEEDED
Status: DISCONTINUED | OUTPATIENT
Start: 2022-02-18 | End: 2022-02-18 | Stop reason: SURG

## 2022-02-18 RX ORDER — SUCCINYLCHOLINE CHLORIDE 20 MG/ML
INJECTION INTRAMUSCULAR; INTRAVENOUS AS NEEDED
Status: DISCONTINUED | OUTPATIENT
Start: 2022-02-18 | End: 2022-02-18 | Stop reason: SURG

## 2022-02-18 RX ORDER — OXYCODONE HYDROCHLORIDE 5 MG/1
5 TABLET ORAL ONCE
Status: COMPLETED | OUTPATIENT
Start: 2022-02-18 | End: 2022-02-18

## 2022-02-18 RX ORDER — ACETAMINOPHEN 325 MG/1
650 TABLET ORAL EVERY 4 HOURS PRN
Qty: 60 TABLET | Refills: 0 | Status: SHIPPED | OUTPATIENT
Start: 2022-02-18 | End: 2022-02-18 | Stop reason: SDUPTHER

## 2022-02-18 RX ORDER — DOCUSATE SODIUM 100 MG/1
200 CAPSULE, LIQUID FILLED ORAL 2 TIMES DAILY
Qty: 120 CAPSULE | Refills: 0 | Status: SHIPPED | OUTPATIENT
Start: 2022-02-18 | End: 2022-03-10 | Stop reason: SDUPTHER

## 2022-02-18 RX ORDER — BUPIVACAINE HCL/0.9 % NACL/PF 0.125 %
PLASTIC BAG, INJECTION (ML) EPIDURAL AS NEEDED
Status: DISCONTINUED | OUTPATIENT
Start: 2022-02-18 | End: 2022-02-18 | Stop reason: SURG

## 2022-02-18 RX ORDER — BUPIVACAINE HYDROCHLORIDE AND EPINEPHRINE 5; 5 MG/ML; UG/ML
INJECTION, SOLUTION PERINEURAL AS NEEDED
Status: DISCONTINUED | OUTPATIENT
Start: 2022-02-18 | End: 2022-02-18 | Stop reason: HOSPADM

## 2022-02-18 RX ADMIN — Medication 3 G: at 13:21

## 2022-02-18 RX ADMIN — IBUPROFEN 600 MG: 600 TABLET ORAL at 16:53

## 2022-02-18 RX ADMIN — FENTANYL CITRATE 50 MCG: 50 INJECTION, SOLUTION INTRAMUSCULAR; INTRAVENOUS at 16:05

## 2022-02-18 RX ADMIN — Medication 200 MCG: at 14:01

## 2022-02-18 RX ADMIN — ACETAMINOPHEN 1000 MG: 500 TABLET ORAL at 12:03

## 2022-02-18 RX ADMIN — ROCURONIUM BROMIDE 45 MG: 10 INJECTION, SOLUTION INTRAVENOUS at 13:40

## 2022-02-18 RX ADMIN — ONDANSETRON 4 MG: 2 INJECTION INTRAMUSCULAR; INTRAVENOUS at 15:10

## 2022-02-18 RX ADMIN — Medication 200 MCG: at 13:42

## 2022-02-18 RX ADMIN — DEXAMETHASONE SODIUM PHOSPHATE 8 MG: 4 INJECTION, SOLUTION INTRA-ARTICULAR; INTRALESIONAL; INTRAMUSCULAR; INTRAVENOUS; SOFT TISSUE at 13:44

## 2022-02-18 RX ADMIN — SUCCINYLCHOLINE CHLORIDE 180 MG: 20 INJECTION, SOLUTION INTRAMUSCULAR; INTRAVENOUS at 13:26

## 2022-02-18 RX ADMIN — FENTANYL CITRATE 50 MCG: 50 INJECTION, SOLUTION INTRAMUSCULAR; INTRAVENOUS at 13:21

## 2022-02-18 RX ADMIN — GABAPENTIN 300 MG: 300 CAPSULE ORAL at 12:03

## 2022-02-18 RX ADMIN — FENTANYL CITRATE 50 MCG: 50 INJECTION, SOLUTION INTRAMUSCULAR; INTRAVENOUS at 16:34

## 2022-02-18 RX ADMIN — OXYCODONE 5 MG: 5 TABLET ORAL at 12:03

## 2022-02-18 RX ADMIN — PROPOFOL 200 MG: 10 INJECTION, EMULSION INTRAVENOUS at 13:26

## 2022-02-18 RX ADMIN — SODIUM CHLORIDE 9 ML/HR: 9 INJECTION, SOLUTION INTRAVENOUS at 12:05

## 2022-02-18 RX ADMIN — GLYCOPYRROLATE 0.4 MG: 0.2 INJECTION INTRAMUSCULAR; INTRAVENOUS at 15:11

## 2022-02-18 RX ADMIN — ESMOLOL HYDROCHLORIDE 30 MG: 10 INJECTION, SOLUTION INTRAVENOUS at 13:36

## 2022-02-18 RX ADMIN — FENTANYL CITRATE 50 MCG: 50 INJECTION, SOLUTION INTRAMUSCULAR; INTRAVENOUS at 13:26

## 2022-02-18 RX ADMIN — FAMOTIDINE 20 MG: 20 TABLET, FILM COATED ORAL at 12:03

## 2022-02-18 RX ADMIN — Medication 200 MCG: at 13:38

## 2022-02-18 RX ADMIN — LIDOCAINE HYDROCHLORIDE 0.5 ML: 10 INJECTION, SOLUTION EPIDURAL; INFILTRATION; INTRACAUDAL; PERINEURAL at 12:05

## 2022-02-18 RX ADMIN — ESMOLOL HYDROCHLORIDE 30 MG: 10 INJECTION INTRAVENOUS at 13:34

## 2022-02-18 RX ADMIN — NEOSTIGMINE METHYLSULFATE 3 MG: 0.5 INJECTION INTRAVENOUS at 15:11

## 2022-02-18 RX ADMIN — Medication 200 MCG: at 13:41

## 2022-02-18 RX ADMIN — ROCURONIUM BROMIDE 5 MG: 10 INJECTION, SOLUTION INTRAVENOUS at 13:26

## 2022-02-18 RX ADMIN — HEPARIN SODIUM 5000 UNITS: 5000 INJECTION, SOLUTION INTRAVENOUS; SUBCUTANEOUS at 12:03

## 2022-02-18 RX ADMIN — SODIUM CHLORIDE: 9 INJECTION, SOLUTION INTRAVENOUS at 14:43

## 2022-02-18 NOTE — INTERVAL H&P NOTE
[facility] Pre-op    Full history and physical note from office is attached.    VS: /105    RR 16  T 96.8 Sat 98%RA    Immunizations:  Influenza:  No  Pneumococcal:  No  Tetanus:  UTD  Covid : No    LAB Results:  Lab Results   Component Value Date    WBC 4.95 02/16/2022    HGB 12.8 02/16/2022    HCT 40.7 02/16/2022    MCV 94.7 02/16/2022     02/16/2022    NEUTROABS 3.69 02/16/2022    GLUCOSE 118 (H) 02/16/2022    BUN 27 (H) 02/16/2022    CREATININE 1.32 (H) 02/16/2022    EGFRIFNONA 41 (L) 02/16/2022     02/16/2022    K 4.7 02/16/2022     02/16/2022    CO2 23.0 02/16/2022    CALCIUM 9.7 02/16/2022    ALBUMIN 4.30 02/16/2022    AST 14 02/16/2022    ALT 19 02/16/2022    BILITOT 0.9 02/16/2022       Cancer Staging (if applicable)  Cancer Patient: __ yes __no __unknown__N/A; If yes, clinical stage T:__ N:__M:__, stage group or __N/A      Impression: Endometrial cancer      Plan: TOTAL LAPAROSCOPIC HYSTERECTOMY BILATERAL SALPINGOOPHORECTOMY, SENTINEL LYMPH NODE DISSECTION (POSSIBLE FULL LYMPH NODE DISSECTION) WITH DAVINCI ROBOT      TERRELL Ruvalcaba   2/18/2022   11:35 EST

## 2022-02-18 NOTE — OP NOTE
Operative Note     Patient Name: Analisa Cortez  : 1960   MRN: [id number]   Date: 22  Time: 15:11 EST    Date of Service:  22  Time of Service:  15:14 EST    Surgical Staff: Surgeon(s) and Role:     * Maria Elena Marie MD - Primary     * Kathie Britt MD - Resident - Assisting   Additional Staff:   Assistant: Mckinley Pyle PA-C  was responsible for performing the following activities: Retraction, Suction, Irrigation, Suturing, Closing, Placing Dressing and Held/Positioned Camera and their skilled assistance was necessary for the success of this case.     Pre-operative diagnosis(es): Pre-Op Diagnosis Codes:     * Endometrial cancer (HCC) [C54.1]  Super morbid obesity with BMI of 69     Post-operative diagnosis(es): Post-Op Diagnosis Codes:     * Endometrial cancer (HCC) [C54.1]  Super morbid obesity with BMI of 69   Procedure(s): Procedure(s):  TOTAL LAPAROSCOPIC HYSTERECTOMY BILATERAL SALPINGOOPHORECTOMY, INJECTION FOR SENTINEL LYMPH NODE MAPPING, BILATERAL PELVIC SENTINEL LYMPH NODE DISSECTION WITH DAVINCI ROBOT     Antibiotics: cefazolin (Ancef) ordered on call to OR     Anesthesia: Type: General  ASA:  III     Objective      Operative findings: Normal appearing cervix. Uterus sounded to 6 cm. Normal-appearing bilateral adnexa. No evidence of extrauterine disease. Bilateral sentinel lymph node mapping to the right obturator and the left obturator and external iliac spaces. Upper abdominal survey normal.   Specimens removed: ID Type Source Tests Collected by Time   A (Not marked as sent) : RIGHT OBTURATOR LYMPH NODE Tissue North Lymph Node TISSUE PATHOLOGY EXAM Maria Elena Marie MD 2022 1413   B (Not marked as sent) : LEFT PELVIC SENTINEL LYMPH NODE #1 Tissue North Lymph Node TISSUE PATHOLOGY EXAM Maria Elena Marie MD 2022 1417   C (Not marked as sent) : LEFT PELVIC SENTINEL LYMPH NODE #2 Tissue North Lymph Node TISSUE PATHOLOGY EXAM Maria Elena Marie MD 2022  1442   D (Not marked as sent) :  Tissue Uterus with Cervix, Bilateral Tubes and Ovaries TISSUE PATHOLOGY EXAM Maria Elena Marie MD 2/18/2022 1447      Fluid Intake and Output: I/O this shift:  In: 1000 [I.V.:1000]  Out: 75 [Blood:75]   Blood products used: No   Drains: Urethral Catheter Silicone 16 Fr. (Active)       [REMOVED] Urethral Catheter Straight-tip; Latex 16 Fr. (Removed)   Output (mL) 60 mL 01/31/22 1148      Implant Information: Implant Name Type Inv. Item Serial No.  Lot No. LRB No. Used Action   KT SEAL HEMOS ABS FLOSEAL MATRX FAST/PREP 10ML - [id number] Implant KT SEAL HEMOS ABS FLOSEAL MATRX FAST/PREP 10ML  [employer] [id number] N/A 1 Implanted      Complications: None   Intraoperative consult(s):    Condition: stable   Disposition: to PACU and then admit to  floor versus discharge home     INDICATIONS FOR PROCEDURE:  Analisa Cortez is a 61 y.o. female with a history of postmenopausal bleeding and uterine sampling demonstrating FIGO grade 1-2 endometrial cancer. She was counseled regarding options and she desired to proceed with hysterectomy, BSO and staging by a robotic approach. Questions were answered and consent was signed.      DESCRIPTION OF PROCEDURE:  The patient was taken to the operating room after the sites had been marked. After a time out procedure was performed, and the planned procedure and patient were confirmed, she was placed under general anesthesia with endotracheal intubation  She received prophylactic antibiotics prior to skin incision.  She was placed in the dorsal lithotomy position in the [facility] and prepared and draped in normal sterile fashion. A machado catheter was placed sterilely.     A supraumbilical incision was then made with a scalpel. The peritoneal cavity was entered with the Veress needle. Correct placement of the Veress needle into the peritoneal cavity was confirmed by a drop in pressure. The abdomen was  insufflated to a pressure of 15 mmHg. An 8-mm endoscope trocar was then placed through the incision with the findings as noted. Three 8-mm trocars were placed in the left and right upper quadrants. A 8-mm endoscopic trocar was placed in the right upper quadrant. The patient was placed in Trendelenburg and the bowel packed into the upper abdomen. The aforementioned findings were noted. The robot was then docked and the instruments placed under direct visualization.      Prior to beginning the hysterectomy, a speculum was placed into the vagina and ICG dye injected into the cervix. A Danielle manipulator (6 cm tip and 3 cm cup) with pneumo-occluder was placed in the vagina.     Attention was first turned turned to the pelvic sentinel lymphadenectomy.  The sigmoid colon was adherent to the left pelvic sidewall and adnexa.  Adhesions were lysed using a combination of blunt and sharp dissection.  The retroperitoneum was opened bilaterally and the pararectal and paravesical spaces were developed.  On the right, she had a sentinel lymph node in the obturator space.  The obturator nerve and ureter were identified and kept out of harm's way.  The lymph node was then carefully dissected free and sent for pathologic evaluation (permanent).  On the left, sentinel nodes were identified in the obturator space and external iliac spaces. The ureter and obturator nerve were identified and kept out of harm's way.  The sentinel lymph node was then dissected free from surrounding tissue and sent for pathology.  Hemostasis was confirmed. Of note, all nodes were removed in bags through the 8-mm right sided port.     The round ligament was grasped, and the peritoneum over the sidewall was opened sharply.  The retroperitoneal space was then further developed, and the ureter was identified.  The peritoneum between the ureter and the infundibulopelvic ligament was then incised and a window was created.  The ovarian vessels were then thoroughly  coagulated and transected.  The round ligament was then transected, and the vesicouterine peritoneum was further skeletonized to the midline. Similar steps were performed on the left side where also the retroperitoneum was opened, the ureter was visualized, and a window was then created between the ovarian vessels and the ureter prior to coagulating and transecting the ovarian vessels. The vesicouterine peritoneum was then further skeletonized, and the vaginal manipulator was further inserted to identify the vagina and to allow dissection of the bladder off of the vagina. Next the uterine vessels were carefully skeletonized before being coagulated and transected.  Subsequent pedicles were created closely along the cervix of the cardinal ligament and uterosacral ligaments. A colpotomy was then made until the uterus was fully removed from the vagina and the uterus handed off for permanent pathology. The vaginal cuff was closed with figure of eight 0-Vicryls.     We again inspected all areas, and they all were found to be hemostatic, and no instruments, needles or sponges were left in the abdomen. The bladder was backfilled with 120 cc of sterile saline and the machado catheter removed. The robotic instruments were removed and the robot undocked. Skin incisions were closed with 4-0 monocryl in a subcuticular fashion, and skin adhesive was placed over these incisions. Sponge, lap and needle counts were correct x2.  The patient was then woken up from anesthesia and taken to PACU in stable condition.

## 2022-02-18 NOTE — ANESTHESIA PROCEDURE NOTES
Airway  Urgency: elective    Date/Time: 2/18/2022 1:26 PM  End Time:2/18/2022 1:27 PM  Airway not difficult    General Information and Staff    Patient location during procedure: OR  CRNA: Gino Still CRNA    Indications and Patient Condition  Indications for airway management: airway protection    Preoxygenated: yes  MILS not maintained throughout  Mask difficulty assessment: 1 - vent by mask    Final Airway Details  Final airway type: endotracheal airway      Successful airway: ETT  Cuffed: yes   Successful intubation technique: video laryngoscopy  Facilitating devices/methods: intubating stylet  Endotracheal tube insertion site: oral  Blade: Zavala  Blade size: 3  ETT size (mm): 7.0  Cormack-Lehane Classification: grade I - full view of glottis  Placement verified by: chest auscultation and capnometry   Measured from: lips  ETT/EBT  to lips (cm): 21  Number of attempts at approach: 1  Assessment: lips, teeth, and gum same as pre-op and atraumatic intubation    Additional Comments  Negative epigastric sounds, Breath sound equal bilaterally with symmetric chest rise and fall

## 2022-02-18 NOTE — ANESTHESIA POSTPROCEDURE EVALUATION
Patient: Analisa Cortez    Procedure Summary     Date: 02/18/22 Room / Location:  [facility] /  [facility]    Anesthesia Start: 1321 Anesthesia Stop:     Procedure: TOTAL LAPAROSCOPIC HYSTERECTOMY BILATERAL SALPINGOOPHORECTOMY, INJECTION FOR SENTINEL LYMPH NODE MAPPING, BILATERAL PELVIC SENTINEL LYMPH NODE DISSECTION WITH DAVINCI ROBOT (N/A Abdomen) Diagnosis:       Endometrial cancer (HCC)      (Endometrial cancer (HCC) [C54.1])    Surgeons: Maria Elena Marie MD Provider: Kurt Simpson MD    Anesthesia Type: general ASA Status: 3          Anesthesia Type: general    Vitals  No vitals data found for the desired time range.          Post Anesthesia Care and Evaluation    Patient location during evaluation: PACU  Patient participation: complete - patient participated  Level of consciousness: awake and alert  Pain score: 3  Pain management: adequate  Airway patency: patent  Anesthetic complications: No anesthetic complications  PONV Status: none  Cardiovascular status: hemodynamically stable and acceptable  Respiratory status: nonlabored ventilation, acceptable and nasal cannula  Hydration status: acceptable

## 2022-02-24 ENCOUNTER — TELEPHONE (OUTPATIENT)
Dept: GYNECOLOGIC ONCOLOGY | Facility: CLINIC | Age: 62
End: 2022-02-24

## 2022-02-24 NOTE — TELEPHONE ENCOUNTER
Called patient to discuss pathology results. She reports that she is doing well since her surgery. She denies any issues. 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 Given these findings no further therapy is recommended. Patient verbalizes understanding and we will see her at her postoperative appointment.    Electronically signed by Maria Elena Marie MD, 02/24/22, 9:49 AM EST.

## 2022-03-10 ENCOUNTER — OFFICE VISIT (OUTPATIENT)
Dept: GYNECOLOGIC ONCOLOGY | Facility: CLINIC | Age: 62
End: 2022-03-10

## 2022-03-10 VITALS
HEART RATE: 108 BPM | TEMPERATURE: 97.1 F | RESPIRATION RATE: 22 BRPM | BODY MASS INDEX: 71.05 KG/M2 | WEIGHT: 293 LBS | OXYGEN SATURATION: 100 %

## 2022-03-10 DIAGNOSIS — I48.91 ATRIAL FIBRILLATION WITH RVR: ICD-10-CM

## 2022-03-10 DIAGNOSIS — Z71.89 OTHER SPECIFIED COUNSELING: ICD-10-CM

## 2022-03-10 DIAGNOSIS — B37.2 SKIN CANDIDIASIS: ICD-10-CM

## 2022-03-10 DIAGNOSIS — C54.1 ENDOMETRIAL CANCER: ICD-10-CM

## 2022-03-10 DIAGNOSIS — Z09 POSTOP CHECK: Primary | ICD-10-CM

## 2022-03-10 PROCEDURE — 99024 POSTOP FOLLOW-UP VISIT: CPT | Performed by: OBSTETRICS & GYNECOLOGY

## 2022-03-10 RX ORDER — DOCUSATE SODIUM 100 MG/1
200 CAPSULE, LIQUID FILLED ORAL 2 TIMES DAILY
Qty: 120 CAPSULE | Refills: 4 | Status: SHIPPED | OUTPATIENT
Start: 2022-03-10 | End: 2023-03-30 | Stop reason: SDUPTHER

## 2022-03-10 NOTE — PROGRESS NOTES
Post Operative Office Visit      Patient Name: Analisa Cortez  : 1960   MRN: [id number]     Chief Complaint:  Postoperative visit    History of Present Illness: Analisa Cortez is a 61 y.o. female who is status post robotic assisted total laparoscopic hysterectomy, bilateral salpingo-oophorectomy, sentinel pelvic lymph node dissection on 22 for endometrial cancer. Her post operative course has been unremarkable and continues to be recovering well. She is tolerating a normal diet. She reports normal return of bowel function.  She states her pain is well controlled. Does note worsening knee pain related to her arthritis since she stopped taking ibuprofen. States that she stopped taking it as her PCP told her she had slightly elevated kidney function tests from ibuprofen use. Also notes itching across her abdomen that she thinks is a yeast infection, would like medicine for it. No other concerns today.     Medical, surgical, and family history updated as needed.  Subjective      Review of Systems:   Review of Systems   Constitutional: Negative for activity change, appetite change, chills and fever.   HENT: Negative for congestion and sore throat.    Respiratory: Negative for shortness of breath.    Cardiovascular: Positive for palpitations. Negative for chest pain.   Gastrointestinal: Negative for abdominal distention, abdominal pain, constipation, diarrhea, nausea and vomiting.   Genitourinary: Negative for dysuria, vaginal bleeding and vaginal pain.   Musculoskeletal: Positive for arthralgias and myalgias.   Neurological: Negative for weakness.   Psychiatric/Behavioral: Negative for agitation and behavioral problems.        Objective     Physical Exam:  Vital Signs:   Vitals:    03/10/22 1304   Pulse: 108   Resp: 22   Temp: 97.1 °F (36.2 °C)   TempSrc: Temporal   SpO2: 100%   Weight: (!) 157 kg (347 lb)   PainSc: 0-No pain     BMI: Body mass index is 71.05 kg/m².     Physical Exam  Vitals  reviewed.   Constitutional:       General: She is not in acute distress.     Appearance: She is obese.   HENT:      Head: Normocephalic and atraumatic.      Right Ear: External ear normal.      Left Ear: External ear normal.   Eyes:      Extraocular Movements: Extraocular movements intact.   Cardiovascular:      Rate and Rhythm: Normal rate. Rhythm irregular.      Comments: Afib   Pulmonary:      Effort: Pulmonary effort is normal. No respiratory distress.      Breath sounds: Normal breath sounds. No wheezing.   Abdominal:      General: Abdomen is flat. Bowel sounds are normal. There is no distension.      Palpations: Abdomen is soft.      Tenderness: There is no abdominal tenderness. There is no guarding or rebound.      Comments: Incisions clean/dry/intact, healing well. Excoriations across abdomen.    Genitourinary:     Comments: External genitalia are free from lesion. On speculum examination, the vaginal cuff was intact and no lesions were appreciated.  On bimanual examination, no fullness was appreciated.  Uterus, cervix and adnexa were absent.  There was no significant tenderness.  Rectovaginal exam was deferred.  Musculoskeletal:      Cervical back: Neck supple.      Right lower leg: No edema.      Left lower leg: No edema.   Skin:     General: Skin is warm and dry.   Neurological:      General: No focal deficit present.      Mental Status: She is alert. Mental status is at baseline.   Psychiatric:         Mood and Affect: Mood normal.         Behavior: Behavior normal.         Medications:     Current Outpatient Medications:   •  docusate sodium (COLACE) 100 MG capsule, Take 2 capsules by mouth 2 (Two) Times a Day., Disp: 120 capsule, Rfl: 4  •  acetaminophen (TYLENOL) 325 MG tablet, Take 2 tablets by mouth Every 4 (Four) Hours As Needed for Mild Pain ., Disp: 60 tablet, Rfl: 0  •  apixaban (ELIQUIS) 5 MG tablet tablet, Take 1 tablet by mouth 2 (Two) Times a Day. Indications: Atrial Fibrillation, Disp: 60  tablet, Rfl: 5  •  aspirin 81 MG EC tablet, Take 81 mg by mouth Daily., Disp: , Rfl:   •  furosemide (LASIX) 20 MG tablet, Take 20 mg by mouth 2 (Two) Times a Day., Disp: , Rfl:   •  irbesartan (AVAPRO) 150 MG tablet, Take 150 mg by mouth Daily., Disp: , Rfl:   •  metoprolol tartrate (LOPRESSOR) 50 MG tablet, Take 1 tablet by mouth 2 (Two) Times a Day., Disp: 180 tablet, Rfl: 3  •  potassium chloride (K-DUR,KLOR-CON) 20 MEQ CR tablet, Take 20 mEq by mouth Daily., Disp: , Rfl:   Current outpatient and discharge medications have been reconciled for the patient.  Reviewed by: Maria Elena Marie MD      Allergies:   No Known Allergies    Pathology:   Final Diagnosis   1.  LYMPH NODE, RIGHT OBTURATOR, EXCISION:  1 lymph node negative for metastatic carcinoma supported by cytokeratin DYLAN immunohistochemical stain with appropriate control (0/1).    2.  LYMPH NODE, LEFT PELVIC SENTINEL NODE #1, EXCISION:  1 lymph node negative for metastatic carcinoma supported by cytokeratin DYLAN immunohistochemical stain with appropriate control (0/1).    3.  LYMPH NODE, LEFT PELVIC SENTINEL NODE #2, EXCISION:  2 lymph nodes negative for metastatic carcinoma supported by cytokeratin DYLAN immunohistochemical stain with appropriate control (0/2).    4.  UTERUS, CERVIX, BILATERAL OVARIES AND FALLOPIAN TUBES, HYSTERECTOMY AND BILATERAL SALPINGO-OOPHORECTOMY:  Endometrioid adenocarcinoma, FIGO grade 2, showing minimal invasion into the myometrium.  No involvement of cervical stroma, parametrium, serosa or bilateral adnexa.  Adenomyosis.  Bilateral physiologic ovaries with no significant histopathologic change.  Serosal endosalpingosis.  See comment and tumor synoptic for additional details.    UTERUS ENDOMETRIUM TEMPLATE:  SPECIMEN TYPE/ PROCEDURE: Total hysterectomy bilateral salpingo-oophorectomy  LYMPH NODE SAMPLING: [place] lymph node sampling  SPECIMEN INTEGRITY:   Intact  TUMOR SIZE (greatest dimension): 4.1 x 2.7 x 1.2 cm  HISTOLOGIC  TYPE: Endometrioid adenocarcinoma  HISTOLOGIC GRADE: FIGO grade 2  MYOMETRIAL INVASION (Present/Not identified): Present               DEPTH OF INVASION (mm): 3               MYOMETRIAL THICKNESS (mm): 15               PERCENTAGE OF MYOMETRIAL INVASION: 20%  INVOLVEMENT OF CERVICAL STROMA: Not identified  EXTENT OF INVOLVEMENT OF OTHER TISSUE/ORGANS: None identified  UTERINE SEROSA INVOLVEMENT: Not identified  MARGINS: Uninvolved  PELVIC LYMPH NODES (SUBMITTED/NONE): [place] nodes only  NUMBER OF PELVIC LYMPH NODES EXAMINED: 4  NUMBER OF PELVIC SENTINEL NODES EXAMINED: 4  LATERALITY OF PELVIC LYMPH NODES EXAMINED: Bilateral  NUMBER OF PELVIC LYMPH NODES WITH MACROMETASTASIS: 0  NUMBER OF PELVIC LYMPH NODES WITH MICROMETASTASIS: 0  NUMBER OF PELVIC LYMPH NODES WITH ITC’S: 0  LATERALITY OF PELVIC NODES WITH TUMOR: N/A  PARA-AORTIC LYMPH NODES (SUBMITTED/NONE): None submitted  LYMPHVASCULAR INVASION: Not identified  MSI TESTING:   Loss of expression of MLH1 and PMS2; MLH1 promoter methylation studies pending  ADDITIONAL PATHOLOGIC FINDINGS: Serosal endosalpingosis; adenomyosis  ANCILLARY STUDIES: Immunohistochemical staining  AJCC PATHOLOGIC STAGE:  (COMPLETED BY PATHOLOGIST, BASED ONLY ON TISSUE FINDINGS, MORE EXTENSIVE DISEASE MAY NOT BE KNOWN TO THE PATHOLOGIST)  pT=  1a  pN=    0            Operative findings again reviewed. Pathology report discussed.       Assessment / Plan    Analisa Cortez is a 61 y.o. who underwent a robotic-assisted total laparoscopic hysterectomy with bilateral salpingo-oophorectomy and bilateral sentinel lymph node dissection. She is recovering well from surgery. We discussed continuing postoperative restrictions. Her pathology report was reviewed and demonstrates a Stage IA (20% myometrial invasion), grade 2 endometrial cancer with no LVSI. She therefore has a low risk of recurrence. Therefore, surgery is considered definitive and she will require no further therapy. She has been  advised that she will require continued surveillance with pelvic examinations, but will no longer require cytology (Pap tests). She was also counseled on possible symptoms of recurrence including vaginal bleeding, pelvic pain, changes in bowel and bladder symptoms, or shortness of breath. She will follow up in 6 months time, but aware that if she experiences any of the above symptoms that she should return for immediate evaluation.  She will also be scheduled for a Survivorship visit with our APRN in 1 month.     Loss of MLH1/PMS2: Awaiting MLH1 promoter methylation.   Skin candidiasis: RX for nystatin powder for yeast infection sent to pharmacy.Counseled patient on keeping incisions clean/dry.   Atrial fibrillation: Follows with cardiology. Doing well on Eliquis.      Diagnoses and all orders for this visit:    1. Postop check (Primary)    2. Other specified counseling    3. Endometrial cancer (HCC)    4. Atrial fibrillation with RVR (HCC)    5. Skin candidiasis    Other orders  -     docusate sodium (COLACE) 100 MG capsule; Take 2 capsules by mouth 2 (Two) Times a Day.  Dispense: 120 capsule; Refill: 4         Follow Up: 6 months     No follow-ups on file.     Corrie Godfrey M.D  Obstetrics & Gynecology, PGY3     Patient was seen and examined with Dr. Godfrey,  resident, who performed portions of the examination and documentation for this patient's care under my direct supervision.  I agree with the above documentation and plan.    I spent 10 minutes with the patient in face-to-face discussion and counseling regarding the significance of the pathology report, future treatment options, and surveillance for her newly diagnosed cancer. This is above and beyond her routine post-operative care and separate from the procedure performed.    Maria Elena Marie MD  3/10/2022  16:14 EST

## 2022-03-14 NOTE — H&P (VIEW-ONLY)
[facility]    Encounter Date: 03/15/2022    Patient ID: Analisa Cortez is a 61 y.o. female.  : 1960     PCP: Leo Fernandez MD       Chief Complaint: Atrial fibrillation with RVR       PROBLEM LIST:  1. Atrial fibrillation  a. EKG 2022: Atrial fibrillation with RVR  2. Congestive heart failure  a. CXR 2021: Pulmonary vascular engorgement and cardiomegaly.  Calcified granuloma right upper lobe.  b. BNP 2021: 266  c. Echo 2022: LV systolic function mildly decreased.  LVEF 45 to 50%. Trace MR  3. Hypertension  4. Morbid obesity with BMI of 70  5. Endometrioid adenocarcinoma, requiring surgical clearance  a. Pathology report 2022: Endometrioid carcinoma, referral to gynecologic oncology 2022  b. Patient scheduled for complete hysterectomy on 22. Oncology surgery would like to hold anticoagulation until immediately after surgery.  6. Surgical history  a. D&C hysteroscopy 2022  b. Hysterectomy/BSO, 2022     History of Present Illness  Patient presents today for a one month follow-up with a history of paroxysmal atrial fibrillation, diastolic heart failure, and cardiac risk factors. Since last visit, patient has been doing well overall from a cardiovascular standpoint. She underwent successful hysterectomy/BSO on 22 and is reportedly cancer free. Eliquis was changed to Xarelto 15 mg daily. Patient denies chest pain, shortness of breath, palpitations, edema, dizziness, and syncope.     No Known Allergies      Current Outpatient Medications:   •  acetaminophen (TYLENOL) 325 MG tablet, Take 2 tablets by mouth Every 4 (Four) Hours As Needed for Mild Pain ., Disp: 60 tablet, Rfl: 0  •  docusate sodium (COLACE) 100 MG capsule, Take 2 capsules by mouth 2 (Two) Times a Day., Disp: 120 capsule, Rfl: 4  •  furosemide (LASIX) 20 MG tablet, Take 20 mg by mouth 2 (Two) Times a Day., Disp: , Rfl:   •  irbesartan (AVAPRO) 150 MG  "tablet, Take 150 mg by mouth Daily., Disp: , Rfl:   •  metoprolol tartrate (LOPRESSOR) 50 MG tablet, Take 1 tablet by mouth 2 (Two) Times a Day., Disp: 180 tablet, Rfl: 3  •  potassium chloride (K-DUR,KLOR-CON) 20 MEQ CR tablet, Take 20 mEq by mouth Daily., Disp: , Rfl:   •  rivaroxaban (XARELTO) 15 MG tablet, Take 15 mg by mouth Daily., Disp: , Rfl:     The following portions of the patient's history were reviewed and updated as appropriate: allergies, current medications, past family history, past medical history, past social history, past surgical history and problem list.    ROS  Review of Systems   Constitution: Negative for chills, fever, fatigue, generalized weakness.   Cardiovascular: Negative for chest pain, dyspnea on exertion, leg swelling, palpitations, orthopnea, and syncope.   Respiratory: Negative for cough, shortness of breath, and wheezing.  HENT: Negative for ear pain, nosebleeds, and tinnitus.  Gastrointestinal: Negative for abdominal pain, constipation, diarrhea, nausea and vomiting.   Genitourinary: No urinary symptoms.  Musculoskeletal: Negative for muscle cramps.  Neurological: Negative for dizziness, headaches, loss of balance, numbness, and symptoms of stroke.  Psychiatric: Normal mental status.     All other systems reviewed and are negative.        Objective:     /86 (BP Location: Left arm, Patient Position: Sitting, Cuff Size: Adult)   Pulse 90   Ht 148.8 cm (58.6\")   Wt (!) 158 kg (349 lb)   SpO2 99%   BMI 71.46 kg/m²      Physical Exam  Constitutional: Patient appears well-developed and well-nourished.   HENT: HEENT exam unremarkable.   Neck: Neck supple. No JVD present. No carotid bruits.   Cardiovascular: Normal rate, irregularly irregular rhythm and normal heart sounds. No murmur heard.   2+ symmetric pulses.   Pulmonary/Chest: Breath sounds normal. Does not exhibit tenderness.   Abdominal: Abdomen benign.   Musculoskeletal: Does not exhibit edema.   Neurological: " Neurological exam unremarkable.   Vitals reviewed.    Data Review:   Lab Results   Component Value Date    GLUCOSE 118 (H) 02/16/2022    BUN 27 (H) 02/16/2022    CREATININE 1.32 (H) 02/16/2022    EGFRIFNONA 41 (L) 02/16/2022    BCR 20.5 02/16/2022    K 4.7 02/16/2022    CO2 23.0 02/16/2022    CALCIUM 9.7 02/16/2022    ALBUMIN 4.30 02/16/2022    AST 14 02/16/2022    ALT 19 02/16/2022      Lab Results   Component Value Date    WBC 4.95 02/16/2022    RBC 4.30 02/16/2022    HGB 12.8 02/16/2022    HCT 40.7 02/16/2022    MCV 94.7 02/16/2022     02/16/2022     Lab Results   Component Value Date    HGBA1C 5.70 (H) 02/16/2022          ECG 12 Lead    Date/Time: 3/15/2022 11:22 AM  Performed by: Natalia Davidson MD  Authorized by: Natalia Davidson MD   Comparison: compared with previous ECG from 2/18/2022  Similar to previous ECG  Rhythm: atrial fibrillation  Rhythm comments: with RVR  BPM: 106  Other findings comments: non-specific T wave abnormality    Clinical impression: abnormal EKG               Assessment:      Diagnosis Plan   1. Atrial fibrillation with RVR (HCC)  EKG displayed AF w/ RVR; GENOVEVA/ECV to be scheduled. Continue metoprolol 50 mg and Xarelto 15 mg    2. Primary hypertension  Acceptable control; continue irbesartan 150 mg and metoprolol 50 mg      Plan:   GENOVEVA/ECV to be scheduled due to atrial fibrillation with RVR.  Subsequently will consider changing her medications to include antiarrhythmic therapy..  Continue current medications.   FU after procedure, sooner as needed.  Thank you for allowing us to participate in the care of your patient.     Scribed for Natalia Davidson MD by Kylie Hinson. 3/15/2022 11:22 EDT      Natalia SLATER MD, personally performed the services described in this documentation as scribed by the above named individual in my presence, and it is both accurate and complete.  3/15/2022  13:17 EDT        Part of this note may be an electronic transcription/translation of spoken language to  printed text using the Dragon Dictation System.

## 2022-03-14 NOTE — PROGRESS NOTES
[facility]iology    Encounter Date: 03/15/2022    Patient ID: Analisa Cortez is a 61 y.o. female.  : 1960     PCP: Leo Fernandez MD       Chief Complaint: Atrial fibrillation with RVR       PROBLEM LIST:  1. Atrial fibrillation  a. EKG 2022: Atrial fibrillation with RVR  2. Congestive heart failure  a. CXR 2021: Pulmonary vascular engorgement and cardiomegaly.  Calcified granuloma right upper lobe.  b. BNP 2021: 266  c. Echo 2022: LV systolic function mildly decreased.  LVEF 45 to 50%. Trace MR  3. Hypertension  4. Morbid obesity with BMI of 70  5. Endometrioid adenocarcinoma, requiring surgical clearance  a. Pathology report 2022: Endometrioid carcinoma, referral to gynecologic oncology 2022  b. Patient scheduled for complete hysterectomy on 22. Oncology surgery would like to hold anticoagulation until immediately after surgery.  6. Surgical history  a. D&C hysteroscopy 2022  b. Hysterectomy/BSO, 2022     History of Present Illness  Patient presents today for a one month follow-up with a history of paroxysmal atrial fibrillation, diastolic heart failure, and cardiac risk factors. Since last visit, patient has been doing well overall from a cardiovascular standpoint. She underwent successful hysterectomy/BSO on 22 and is reportedly cancer free. Eliquis was changed to Xarelto 15 mg daily. Patient denies chest pain, shortness of breath, palpitations, edema, dizziness, and syncope.     No Known Allergies      Current Outpatient Medications:   •  acetaminophen (TYLENOL) 325 MG tablet, Take 2 tablets by mouth Every 4 (Four) Hours As Needed for Mild Pain ., Disp: 60 tablet, Rfl: 0  •  docusate sodium (COLACE) 100 MG capsule, Take 2 capsules by mouth 2 (Two) Times a Day., Disp: 120 capsule, Rfl: 4  •  furosemide (LASIX) 20 MG tablet, Take 20 mg by mouth 2 (Two) Times a Day., Disp: , Rfl:   •  irbesartan (AVAPRO) 150 MG  "tablet, Take 150 mg by mouth Daily., Disp: , Rfl:   •  metoprolol tartrate (LOPRESSOR) 50 MG tablet, Take 1 tablet by mouth 2 (Two) Times a Day., Disp: 180 tablet, Rfl: 3  •  potassium chloride (K-DUR,KLOR-CON) 20 MEQ CR tablet, Take 20 mEq by mouth Daily., Disp: , Rfl:   •  rivaroxaban (XARELTO) 15 MG tablet, Take 15 mg by mouth Daily., Disp: , Rfl:     The following portions of the patient's history were reviewed and updated as appropriate: allergies, current medications, past family history, past medical history, past social history, past surgical history and problem list.    ROS  Review of Systems   Constitution: Negative for chills, fever, fatigue, generalized weakness.   Cardiovascular: Negative for chest pain, dyspnea on exertion, leg swelling, palpitations, orthopnea, and syncope.   Respiratory: Negative for cough, shortness of breath, and wheezing.  HENT: Negative for ear pain, nosebleeds, and tinnitus.  Gastrointestinal: Negative for abdominal pain, constipation, diarrhea, nausea and vomiting.   Genitourinary: No urinary symptoms.  Musculoskeletal: Negative for muscle cramps.  Neurological: Negative for dizziness, headaches, loss of balance, numbness, and symptoms of stroke.  Psychiatric: Normal mental status.     All other systems reviewed and are negative.        Objective:     /86 (BP Location: Left arm, Patient Position: Sitting, Cuff Size: Adult)   Pulse 90   Ht 148.8 cm (58.6\")   Wt (!) 158 kg (349 lb)   SpO2 99%   BMI 71.46 kg/m²      Physical Exam  Constitutional: Patient appears well-developed and well-nourished.   HENT: HEENT exam unremarkable.   Neck: Neck supple. No JVD present. No carotid bruits.   Cardiovascular: Normal rate, irregularly irregular rhythm and normal heart sounds. No murmur heard.   2+ symmetric pulses.   Pulmonary/Chest: Breath sounds normal. Does not exhibit tenderness.   Abdominal: Abdomen benign.   Musculoskeletal: Does not exhibit edema.   Neurological: " Neurological exam unremarkable.   Vitals reviewed.    Data Review:   Lab Results   Component Value Date    GLUCOSE 118 (H) 02/16/2022    BUN 27 (H) 02/16/2022    CREATININE 1.32 (H) 02/16/2022    EGFRIFNONA 41 (L) 02/16/2022    BCR 20.5 02/16/2022    K 4.7 02/16/2022    CO2 23.0 02/16/2022    CALCIUM 9.7 02/16/2022    ALBUMIN 4.30 02/16/2022    AST 14 02/16/2022    ALT 19 02/16/2022      Lab Results   Component Value Date    WBC 4.95 02/16/2022    RBC 4.30 02/16/2022    HGB 12.8 02/16/2022    HCT 40.7 02/16/2022    MCV 94.7 02/16/2022     02/16/2022     Lab Results   Component Value Date    HGBA1C 5.70 (H) 02/16/2022          ECG 12 Lead    Date/Time: 3/15/2022 11:22 AM  Performed by: Natalia Davidson MD  Authorized by: Natalia Davidson MD   Comparison: compared with previous ECG from 2/18/2022  Similar to previous ECG  Rhythm: atrial fibrillation  Rhythm comments: with RVR  BPM: 106  Other findings comments: non-specific T wave abnormality    Clinical impression: abnormal EKG               Assessment:      Diagnosis Plan   1. Atrial fibrillation with RVR (HCC)  EKG displayed AF w/ RVR; GENOVEVA/ECV to be scheduled. Continue metoprolol 50 mg and Xarelto 15 mg    2. Primary hypertension  Acceptable control; continue irbesartan 150 mg and metoprolol 50 mg      Plan:   GENOVEVA/ECV to be scheduled due to atrial fibrillation with RVR.  Subsequently will consider changing her medications to include antiarrhythmic therapy..  Continue current medications.   FU after procedure, sooner as needed.  Thank you for allowing us to participate in the care of your patient.     Scribed for Natalia Davidson MD by Kylie Hinson. 3/15/2022 11:22 EDT      Natalia SLATER MD, personally performed the services described in this documentation as scribed by the above named individual in my presence, and it is both accurate and complete.  3/15/2022  13:17 EDT        Part of this note may be an electronic transcription/translation of spoken language to  printed text using the Dragon Dictation System.

## 2022-03-15 ENCOUNTER — OFFICE VISIT (OUTPATIENT)
Dept: CARDIOLOGY | Facility: CLINIC | Age: 62
End: 2022-03-15

## 2022-03-15 VITALS
HEART RATE: 90 BPM | BODY MASS INDEX: 59.07 KG/M2 | HEIGHT: 59 IN | SYSTOLIC BLOOD PRESSURE: 149 MMHG | DIASTOLIC BLOOD PRESSURE: 86 MMHG | WEIGHT: 293 LBS | OXYGEN SATURATION: 99 %

## 2022-03-15 DIAGNOSIS — I48.91 ATRIAL FIBRILLATION WITH RVR: Primary | ICD-10-CM

## 2022-03-15 DIAGNOSIS — I10 PRIMARY HYPERTENSION: ICD-10-CM

## 2022-03-15 PROCEDURE — 99214 OFFICE O/P EST MOD 30 MIN: CPT | Performed by: INTERNAL MEDICINE

## 2022-03-15 PROCEDURE — 93000 ELECTROCARDIOGRAM COMPLETE: CPT | Performed by: INTERNAL MEDICINE

## 2022-03-17 ENCOUNTER — TELEPHONE (OUTPATIENT)
Dept: GYNECOLOGIC ONCOLOGY | Facility: CLINIC | Age: 62
End: 2022-03-17

## 2022-03-17 ENCOUNTER — PREP FOR SURGERY (OUTPATIENT)
Dept: OTHER | Facility: HOSPITAL | Age: 62
End: 2022-03-17

## 2022-03-17 DIAGNOSIS — C54.1 ENDOMETRIAL CANCER: Primary | ICD-10-CM

## 2022-03-17 LAB
CYTO UR: NORMAL
LAB AP CASE REPORT: NORMAL
LAB AP CLINICAL INFORMATION: NORMAL
LAB AP DIAGNOSIS COMMENT: NORMAL
LAB AP INTEGRATED ONCOLOGY, ADDENDUM: NORMAL
LAB AP SPECIAL STAINS: NORMAL
PATH REPORT.FINAL DX SPEC: NORMAL
PATH REPORT.GROSS SPEC: NORMAL

## 2022-03-17 RX ORDER — SODIUM CHLORIDE 0.9 % (FLUSH) 0.9 %
10 SYRINGE (ML) INJECTION AS NEEDED
Status: CANCELLED | OUTPATIENT
Start: 2022-03-17

## 2022-03-17 RX ORDER — SODIUM CHLORIDE 0.9 % (FLUSH) 0.9 %
10 SYRINGE (ML) INJECTION EVERY 12 HOURS SCHEDULED
Status: CANCELLED | OUTPATIENT
Start: 2022-03-17

## 2022-03-17 NOTE — TELEPHONE ENCOUNTER
----- Message from Maria Elena Marie MD sent at 3/17/2022 12:37 PM EDT -----  Please let Ms. Cortez know that we did some testing on her tumor to see if there was a genetic reason that the cancer developed. The pathology report was just updated and did show a change that could possibly be due to a genetic problem. I have placed a referred to genetics. Millie, can you get this scheduled.   ----- Message -----  From: Lab, Background User  Sent: 2/23/2022   3:50 PM EDT  To: Maria Elena Marie MD

## 2022-03-24 ENCOUNTER — HOSPITAL ENCOUNTER (OUTPATIENT)
Dept: CARDIOLOGY | Facility: HOSPITAL | Age: 62
Setting detail: HOSPITAL OUTPATIENT SURGERY
Discharge: HOME | End: 2022-03-24
Attending: INTERNAL MEDICINE | Admitting: INTERNAL MEDICINE

## 2022-03-24 VITALS
HEART RATE: 66 BPM | OXYGEN SATURATION: 99 % | DIASTOLIC BLOOD PRESSURE: 70 MMHG | HEIGHT: 58 IN | TEMPERATURE: 97.1 F | BODY MASS INDEX: 61.5 KG/M2 | WEIGHT: 293 LBS | RESPIRATION RATE: 17 BRPM | SYSTOLIC BLOOD PRESSURE: 121 MMHG

## 2022-03-24 DIAGNOSIS — I48.91 ATRIAL FIBRILLATION WITH RVR: ICD-10-CM

## 2022-03-24 DIAGNOSIS — I10 PRIMARY HYPERTENSION: ICD-10-CM

## 2022-03-24 LAB
ANION GAP SERPL CALCULATED.3IONS-SCNC: 14 MMOL/L (ref 5–15)
BH CV ECHO MEAS - BSA(HAYCOCK): 2.7 M^2
BH CV ECHO MEAS - BSA: 2.3 M^2
BH CV ECHO MEAS - BZI_BMI: 71.7 KILOGRAMS/M^2
BH CV ECHO MEAS - BZI_METRIC_HEIGHT: 147.3 CM
BH CV ECHO MEAS - BZI_METRIC_WEIGHT: 155.6 KG
BH CV ECHO MEAS - TR MAX PG: 17 MMHG
BH CV ECHO MEAS - TR MAX VEL: 209.3 CM/SEC
BH CV VAS BP RIGHT ARM: NORMAL MMHG
BUN SERPL-MCNC: 26 MG/DL (ref 8–23)
BUN/CREAT SERPL: 19.8 (ref 7–25)
CALCIUM SPEC-SCNC: 9.3 MG/DL (ref 8.6–10.5)
CHLORIDE SERPL-SCNC: 107 MMOL/L (ref 98–107)
CO2 SERPL-SCNC: 20 MMOL/L (ref 22–29)
CREAT SERPL-MCNC: 1.31 MG/DL (ref 0.57–1)
DEPRECATED RDW RBC AUTO: 46 FL (ref 37–54)
EGFRCR SERPLBLD CKD-EPI 2021: 46.2 ML/MIN/1.73
ERYTHROCYTE [DISTWIDTH] IN BLOOD BY AUTOMATED COUNT: 13.2 % (ref 12.3–15.4)
GLUCOSE SERPL-MCNC: 126 MG/DL (ref 65–99)
HCT VFR BLD AUTO: 37.5 % (ref 34–46.6)
HGB BLD-MCNC: 11.8 G/DL (ref 12–15.9)
LV EF 2D ECHO EST: 50 %
MAXIMAL PREDICTED HEART RATE: 158 BPM
MAXIMAL PREDICTED HEART RATE: 158 BPM
MCH RBC QN AUTO: 29.9 PG (ref 26.6–33)
MCHC RBC AUTO-ENTMCNC: 31.5 G/DL (ref 31.5–35.7)
MCV RBC AUTO: 94.9 FL (ref 79–97)
PLATELET # BLD AUTO: 244 10*3/MM3 (ref 140–450)
PMV BLD AUTO: 10.8 FL (ref 6–12)
POTASSIUM SERPL-SCNC: 4.3 MMOL/L (ref 3.5–5.2)
QT INTERVAL: 410 MS
QTC INTERVAL: 451 MS
RBC # BLD AUTO: 3.95 10*6/MM3 (ref 3.77–5.28)
SODIUM SERPL-SCNC: 141 MMOL/L (ref 136–145)
STRESS TARGET HR: 134 BPM
STRESS TARGET HR: 134 BPM
WBC NRBC COR # BLD: 5.43 10*3/MM3 (ref 3.4–10.8)

## 2022-03-24 PROCEDURE — 93325 DOPPLER ECHO COLOR FLOW MAPG: CPT

## 2022-03-24 PROCEDURE — 92960 CARDIOVERSION ELECTRIC EXT: CPT

## 2022-03-24 PROCEDURE — 93321 DOPPLER ECHO F-UP/LMTD STD: CPT | Performed by: INTERNAL MEDICINE

## 2022-03-24 PROCEDURE — 25010000002 MIDAZOLAM PER 1 MG: Performed by: INTERNAL MEDICINE

## 2022-03-24 PROCEDURE — 92960 CARDIOVERSION ELECTRIC EXT: CPT | Performed by: INTERNAL MEDICINE

## 2022-03-24 PROCEDURE — 93312 ECHO TRANSESOPHAGEAL: CPT

## 2022-03-24 PROCEDURE — 93325 DOPPLER ECHO COLOR FLOW MAPG: CPT | Performed by: INTERNAL MEDICINE

## 2022-03-24 PROCEDURE — 93005 ELECTROCARDIOGRAM TRACING: CPT | Performed by: INTERNAL MEDICINE

## 2022-03-24 PROCEDURE — 80048 BASIC METABOLIC PNL TOTAL CA: CPT

## 2022-03-24 PROCEDURE — 93321 DOPPLER ECHO F-UP/LMTD STD: CPT

## 2022-03-24 PROCEDURE — 93312 ECHO TRANSESOPHAGEAL: CPT | Performed by: INTERNAL MEDICINE

## 2022-03-24 PROCEDURE — 85027 COMPLETE CBC AUTOMATED: CPT

## 2022-03-24 PROCEDURE — 36415 COLL VENOUS BLD VENIPUNCTURE: CPT

## 2022-03-24 RX ORDER — SODIUM CHLORIDE 0.9 % (FLUSH) 0.9 %
10 SYRINGE (ML) INJECTION AS NEEDED
Status: DISCONTINUED | OUTPATIENT
Start: 2022-03-24 | End: 2022-03-24 | Stop reason: HOSPADM

## 2022-03-24 RX ORDER — MIDAZOLAM HYDROCHLORIDE 1 MG/ML
INJECTION INTRAMUSCULAR; INTRAVENOUS
Status: DISCONTINUED
Start: 2022-03-24 | End: 2022-03-24 | Stop reason: HOSPADM

## 2022-03-24 RX ORDER — SOTALOL HYDROCHLORIDE 80 MG/1
80 TABLET ORAL EVERY 12 HOURS SCHEDULED
Status: DISCONTINUED | OUTPATIENT
Start: 2022-03-24 | End: 2022-03-24 | Stop reason: HOSPADM

## 2022-03-24 RX ORDER — SODIUM CHLORIDE 0.9 % (FLUSH) 0.9 %
10 SYRINGE (ML) INJECTION EVERY 12 HOURS SCHEDULED
Status: DISCONTINUED | OUTPATIENT
Start: 2022-03-24 | End: 2022-03-24 | Stop reason: HOSPADM

## 2022-03-24 RX ORDER — MIDAZOLAM HYDROCHLORIDE 1 MG/ML
INJECTION INTRAMUSCULAR; INTRAVENOUS
Status: COMPLETED | OUTPATIENT
Start: 2022-03-24 | End: 2022-03-24

## 2022-03-24 RX ORDER — FENTANYL CITRATE 50 UG/ML
INJECTION, SOLUTION INTRAMUSCULAR; INTRAVENOUS
Status: DISCONTINUED
Start: 2022-03-24 | End: 2022-03-24 | Stop reason: WASHOUT

## 2022-03-24 RX ORDER — NALOXONE HYDROCHLORIDE 0.4 MG/ML
INJECTION, SOLUTION INTRAMUSCULAR; INTRAVENOUS; SUBCUTANEOUS
Status: DISCONTINUED
Start: 2022-03-24 | End: 2022-03-24 | Stop reason: WASHOUT

## 2022-03-24 RX ORDER — SOTALOL HYDROCHLORIDE 80 MG/1
80 TABLET ORAL 2 TIMES DAILY
Qty: 60 TABLET | Refills: 3 | Status: SHIPPED | OUTPATIENT
Start: 2022-03-24 | End: 2022-05-31 | Stop reason: SDUPTHER

## 2022-03-24 RX ORDER — FLUMAZENIL 0.1 MG/ML
INJECTION INTRAVENOUS
Status: DISCONTINUED
Start: 2022-03-24 | End: 2022-03-24 | Stop reason: WASHOUT

## 2022-03-24 RX ADMIN — METHOHEXITAL SODIUM 40 MG: 500 INJECTION, POWDER, LYOPHILIZED, FOR SOLUTION INTRAMUSCULAR; INTRAVENOUS; RECTAL at 11:01

## 2022-03-24 RX ADMIN — MIDAZOLAM 2 MG: 1 INJECTION INTRAMUSCULAR; INTRAVENOUS at 11:01

## 2022-03-24 RX ADMIN — SOTALOL HYDROCHLORIDE 80 MG: 80 TABLET ORAL at 12:24

## 2022-03-24 NOTE — DISCHARGE INSTRUCTIONS
On Monday March 24th please have an EKG done and have it faxed to Dr. Davidson's office. Their fax number is [phone].

## 2022-03-24 NOTE — INTERVAL H&P NOTE
H&P reviewed. The patient was examined and there are no changes to the H&P.      Vitals: BP: 154/86 Pulse: 107 O2: 96      Lab Results   Component Value Date    WBC 5.43 03/24/2022    HGB 11.8 (L) 03/24/2022    HCT 37.5 03/24/2022    MCV 94.9 03/24/2022     03/24/2022     Lab Results   Component Value Date    GLUCOSE 126 (H) 03/24/2022    BUN 26 (H) 03/24/2022    CREATININE 1.31 (H) 03/24/2022    EGFRIFNONA 41 (L) 02/16/2022    BCR 19.8 03/24/2022    K 4.3 03/24/2022    CO2 20.0 (L) 03/24/2022    CALCIUM 9.3 03/24/2022    ALBUMIN 4.30 02/16/2022    AST 14 02/16/2022    ALT 19 02/16/2022     Patient is a 61 year old female with afib with RVR, on Xarelto, who presents today for GENOVEVA + ECV. The risks, benefits and alternatives were discussed and the patient wishes to proceed.     Marina Freeman PA-C

## 2022-03-28 ENCOUNTER — TELEPHONE (OUTPATIENT)
Dept: CARDIOLOGY | Facility: CLINIC | Age: 62
End: 2022-03-28

## 2022-03-28 DIAGNOSIS — I48.91 ATRIAL FIBRILLATION WITH RVR: Primary | ICD-10-CM

## 2022-04-18 RX ORDER — IRBESARTAN 150 MG/1
150 TABLET ORAL DAILY
Qty: 90 TABLET | Refills: 0 | Status: SHIPPED | OUTPATIENT
Start: 2022-04-18

## 2022-04-18 NOTE — TELEPHONE ENCOUNTER
Caller: Diego Analisa Kennedy    Relationship: Self    Best call back number: [phone]  CAN CALL ANYTIME AND MAY LEAVE .    Requested Prescriptions: irbesartan (AVAPRO) 150 MG tablet 90 DAY SUPPLY  IBUPROFEN 800MG 90 DAY SUPPLY PATIENT RECALLS TAKING 3 TIMES A DAY.       Pharmacy where request should be sent:   [facility] - [place], [place] - [address] [place] [address] - [phone]  - [phone] [address] [address]   Phone:  [phone]  Fax:  [phone]     Additional details provided by patient: PATIENT STATED HER PCP HAD RETIRED AND MD ANNA STATED SHE WOULD FILL PATIENTS MEDICATION IN THE MEAN TIME.  PATIENT STATED HER MEDICATIONS WERE ALL FILLED 90 DAY SUPPLY PRIOR.     Does the patient have less than a 3 day supply:  [x] Yes  [] No  PATIENT WILL RUN OUT BY Friday 4/22/22.        BATSHEVA/Jaycee Velez Rep   04/18/22 10:02 EDT

## 2022-05-17 ENCOUNTER — TELEPHONE (OUTPATIENT)
Dept: ONCOLOGY | Facility: HOSPITAL | Age: 62
End: 2022-05-17

## 2022-05-19 ENCOUNTER — CLINICAL SUPPORT (OUTPATIENT)
Dept: GENETICS | Facility: HOSPITAL | Age: 62
End: 2022-05-19

## 2022-05-19 DIAGNOSIS — C54.1 ENDOMETRIAL CANCER: ICD-10-CM

## 2022-05-19 DIAGNOSIS — Z80.3 FAMILY HISTORY OF MALIGNANT NEOPLASM OF BREAST: ICD-10-CM

## 2022-05-19 DIAGNOSIS — Z13.79 GENETIC TESTING: Primary | ICD-10-CM

## 2022-05-24 NOTE — PROGRESS NOTES
ADDENDUM 8/4/2022: Ms. Cortez did not submit her saliva sample to [facility] for genetic testing. Testing remains available to her in the future and she can contact our office when she is ready to pursue it. Testing can be completed on a saliva sample or blood sample. She can call us at [phone] with any questions.         Analisa Cortez, a 62-year-old female, was referred for genetic counseling due to a personal history of endometrial cancer and abnormal IHC on tumor screening for Funes syndrome.  Genetic counseling was provided via telehealth.  Ms. Cortez was recently diagnosed with endometrial cancer at 61. Reflex tumor testing was performed to screen for Funes syndrome, and staining on tumor specimen was absent for the MLH1 and PMS2 proteins.  This pattern indicates a 20% risk for Funse syndrome.  Further tumor testing through MLH1 promoter methylation analysis was performed and was negative, therefore germline testing is indicated to evaluate for Funes syndrome.  Ms. Cortez has never had a colonoscopy. We discussed the option of the [employer] [facility] TumorNext- Funes plus CancerNext panel, which will provide comprehensive analysis of the Funes syndrome genes, as well as 31 additional genes associated with hereditary cancer risk.   The TumorNext test also includes further tumor testing evaluates for somatic mutations that could provide explanation for the abnormal IHC result.  Results are expected in 4-5 weeks.      FAMILY HISTORY:   Mat. Aunt:  Bladder cancer, 65  Pat.1st cousin: Breast cancer, 50  Pat. 1st cousin: Breast cancer, 50    RISK ASSESSMENT:  Approximately 20% of individuals with absent MLH1 and PMS2 on IHC are found to have a germline mutation in MLH1.  In the other 80% of individuals, MLH1 promoter hypermethylation or a BRAF mutation can be identified, providing an alternate explanation for the abnormal IHC. Follow-up tumor testing did not identify MLH1 promotor hypermethylation, therefore germline  testing is recommended as the next step for evaluation of Funes syndrome. The paired tumor/germline testing performed through TumorNext will evaluate for all possible explanations. The standard approach to genetic testing is via a multigene panel.     GENETIC COUNSELING: We reviewed the family history information in detail.  Cases of cancer follow three general patterns: sporadic, familial, and hereditary.  While most cancer is sporadic, some cases appear to occur in family clusters.  These cases are said to be familial and account for 10-20% of cancer cases.  Familial cases may be due to a combination of shared genes and environmental factors among family members.  In even fewer families, the cancer is said to be inherited, and the genes responsible for the cancer are known.      The pedigree patterns observed in sporadic versus hereditary cancer families were reviewed.  Family histories typical of hereditary cancer syndromes usually include multiple first- and second-degree relatives diagnosed with cancer types that define a syndrome.  These cases tend to be diagnosed at younger-than-expected ages and can be bilateral or multifocal.  The cancer in these families follows an autosomal dominant inheritance pattern, which indicates the likely presence of a mutation in a cancer susceptibility gene.  Children and siblings of an individual believed to carry this mutation have a 50% chance of inheriting that mutation, thereby inheriting the increased risk to develop cancer.    The most common hereditary condition associated with an increased risk for endometrial cancer is Funes syndrome.  The lifetime risk for colon cancer for individuals with Funes syndrome is up to 80% if there is no intervention (i.e. removal of polyps detected on colonoscopy).  Routine screening colonoscopy has been shown to reduce the incidence and mortality of colon cancer in Funes syndrome families by as much as 65%.  Other risks include cancer of  the endometrium/uterus, ovary, stomach, urinary tract, small intestines, biliary tract, and brain.  MLH1 and MSH2 carry these higher risks, while MSH6 and PMS2 carry significantly lower risks.  There are also other, less common, hereditary cancer syndromes.  Genetic testing is typically performed through a multigene panel, evaluating the Funes syndrome genes as well as other genes associated with hereditary risk for colon cancer and other cancers.  Ms. Cortez elected to pursue genetic testing to learn more information about potential risks to herself and relatives.    GENETIC TESTING:  The risks, benefits, and limitations of genetic testing and implications for clinical management following testing were reviewed.  DNA test results can influence decisions regarding screening, prevention, and surgical management.  Genetic testing can have significant psychological implications for both individuals and families.  Also discussed was the possibility of employment and insurance discrimination based on genetic test results and the laws in place to prevent this (RADU).  The implications of a positive or negative test result were discussed.  We also discussed the possibility that, in some cases, genetic test results may be ambiguous due to the identification of a genetic variant of uncertain significance (VUS).  These variants may or may not be associated with an increased cancer risk.  VUSs are frequently reported through genetic testing, and the majority are ultimately reclassified as benign variation.    PLAN: Genetic testing was ordered through a paired germline and tumor test through [facility]. A saliva kit is being sent to Ms. Cortez and results are expected 4-5 weeks after she sends in her sample. Ms. Cortez will be contacted with results once they are available. We encouraged Ms. Cortez to contact us with any questions or concerns in the meantime at [phone].      Krystal Richards MS, [facility], [facility]  Licensed  Certified Genetic Counselor

## 2022-05-31 ENCOUNTER — OFFICE VISIT (OUTPATIENT)
Dept: CARDIOLOGY | Facility: CLINIC | Age: 62
End: 2022-05-31

## 2022-05-31 VITALS
HEART RATE: 68 BPM | WEIGHT: 293 LBS | OXYGEN SATURATION: 97 % | BODY MASS INDEX: 61.5 KG/M2 | SYSTOLIC BLOOD PRESSURE: 140 MMHG | HEIGHT: 58 IN | DIASTOLIC BLOOD PRESSURE: 58 MMHG

## 2022-05-31 DIAGNOSIS — I48.91 ATRIAL FIBRILLATION WITH RVR: Primary | ICD-10-CM

## 2022-05-31 DIAGNOSIS — I10 PRIMARY HYPERTENSION: ICD-10-CM

## 2022-05-31 PROCEDURE — 93000 ELECTROCARDIOGRAM COMPLETE: CPT | Performed by: INTERNAL MEDICINE

## 2022-05-31 PROCEDURE — 99214 OFFICE O/P EST MOD 30 MIN: CPT | Performed by: INTERNAL MEDICINE

## 2022-05-31 RX ORDER — POTASSIUM CHLORIDE 750 MG/1
10 TABLET, EXTENDED RELEASE ORAL DAILY
Qty: 90 TABLET | Refills: 1 | Status: SHIPPED | OUTPATIENT
Start: 2022-05-31 | End: 2023-02-07 | Stop reason: SDUPTHER

## 2022-05-31 RX ORDER — SOTALOL HYDROCHLORIDE 80 MG/1
80 TABLET ORAL 2 TIMES DAILY
Qty: 180 TABLET | Refills: 3 | Status: SHIPPED | OUTPATIENT
Start: 2022-05-31 | End: 2023-02-07 | Stop reason: SDUPTHER

## 2022-05-31 RX ORDER — FUROSEMIDE 20 MG/1
20 TABLET ORAL 2 TIMES DAILY
Qty: 90 TABLET | Refills: 1 | Status: SHIPPED | OUTPATIENT
Start: 2022-05-31 | End: 2023-02-07 | Stop reason: SDUPTHER

## 2022-06-27 ENCOUNTER — TELEPHONE (OUTPATIENT)
Dept: GENETICS | Facility: HOSPITAL | Age: 62
End: 2022-06-27

## 2022-06-27 NOTE — TELEPHONE ENCOUNTER
Called patient to discuss the saliva genetic testing kit that was sent to her on 4/28. She said she would get this sent back to the lab this week.

## 2022-09-07 NOTE — PROGRESS NOTES
Follow Up Office Visit      Patient Name: Analisa Cortez  : 1960   MRN: [id number]     Chief Complaint:  Follow up, Endometrial cancer    History of Present Illness: Analisa Cortez is a 62 y.o. female who is here today to follow up with Gynecologic Oncology for stage 1A grade 2 endometrial cancer. Today, she is doing well. She denies vaginal bleeding and discharge. She does not have abdominal or pelvic pain. She is tolerating a regular diet and endorses a normal appetite. She denies nausea and vomiting. She denies early satiety and bloating. Denies any CP, SOB, lightheadedness or dizziness. She denies changes in her bowel/bladder.     Oncologic History:  Oncology/Hematology History   Endometrial cancer (HCC)   2022 Initial Diagnosis    Referred by LATONIA Leung/Dr. Estella Pandey    Patient presented with postmenopausal bleeding    2022: TVUS with uterus with endometrial stripe thickness of 29 mm.  Unable to visualize either ovary.  No obvious adnexal masses.  Scant free fluid.  2022: Hysteroscopy with D&C with well to moderately differentiated endometrioid adenocarcinoma     2022 Surgery    Robotic assisted total laparoscopic hysterectomy with bilateral salpingo-oophorectomy and bilateral pelvic sentinel lymph node dissection.    Pathology with a FIGO grade 2 endometrioid adenocarcinoma with 20% myometrial involvement.  Negative LVSI.  Negative cervix and adnexa.  Negative sentinel lymph nodes.  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    Surgery at [facility] by Maria Elena Marie MD              I have reviewed and the following portions of the patient's history were updated as appropriate: past family history, past medical history, past social history, past surgical history, past obstetrics/gynecologic history and problem list.    Medications: The current medication list was reviewed with the patient and updated in the EMR this date per the Medical  Assistant. Medication dosages and frequencies were confirmed to be accurate.      Allergies:   No Known Allergies    Subjective   Review of Systems:   As per HPI.     Objective     Physical Exam:  Vital Signs:   Vitals:    09/08/22 1304   BP: (!) 196/89   Pulse: 81   Resp: 15   Temp: 98.4 °F (36.9 °C)   SpO2: 100%   Weight: (!) 158 kg (348 lb 11.2 oz)   PainSc: 0-No pain     BMI: Body mass index is 72.88 kg/m².   ECOG score: 0           PHQ-2 Depression Screening  Little interest or pleasure in doing things?     Feeling down, depressed, or hopeless?     PHQ-2 Total Score       Physical Examination:   General appearance - alert, well appearing, and in no distress and oriented to person, place, and time  Mental status - normal mood, behavior, speech, dress, motor activity, and thought processes  Eyes - sclera anicteric, left eye normal, right eye normal  Ears - right ear normal, left ear normal  Nose - mask  Mouth - mask  Heart - normal rate, regular rhythm, normal S1, S2, no murmurs, rubs, clicks or gallops  Abdomen - soft, nontender, nondistended, no masses or organomegaly  Pelvic - Speculum exam benign. Vagina smooth without lesions, bleeding, discharge. Vaginal cuff intact.   Neurological - alert, oriented, normal speech, no focal findings or movement disorder noted  Musculoskeletal - no joint tenderness, deformity or swelling  Skin - normal coloration and turgor, no rashes, no suspicious skin lesions noted     Assessment / Plan    Analisa Cortez is a 62 y.o. with a history of a stage 1A grade 2 endometrial cancer with no LVSI s/p robotic-assisted total laparoscopic hysterectomy with bilateral salpingo-oophorectomy and bilateral sentinel lymph node dissection in Feb 2022. She is presenting today for a survivorship appointment.  She is currently without clinical evidence of disease. Signs of recurrent disease, such as vaginal bleeding, persistent abdominopelvic pain, urinary or bowel changes, and shortness of  breath were reviewed.  She was advised to follow up immediately if she develops any of the above symptoms. Additionally, results indicate loss of MLH1/PMS2 and absence of MLH1 promoter methylation. She will complete home kit for genetic testing and follow up with genetic counselor.  She will follow-up in 6 months.    Health maintenance: She was reminded to maintain a healthy lifestyle with a well balanced diet, calcium and vitamin D for osteoporosis prevention, and exercise as well as continue with recommended health and cancer screening guidelines.     Pain assessment was performed today as a part of patient’s care.  For patients with pain related to surgery, gynecologic malignancy or cancer treatment, the plan is as noted in the assessment/plan.  For patients with pain not related to these issues, they are to seek any further needed care from a more appropriate provider, such as PCP.      Diagnoses and all orders for this visit:    1. History of endometrial cancer (Primary)  -     Ambulatory Referral to Genetic Counseling/Testing           Follow Up: 6 months     Anny Cisse MD  Gynecologic Oncology Resident    Patient was seen and examined with Dr. Cisse,  resident, who performed portions of the examination and documentation for this patient's care under my direct supervision.  I agree with the above documentation and plan.    ARIES Marie MD  Gynecologic Oncology

## 2022-09-08 ENCOUNTER — OFFICE VISIT (OUTPATIENT)
Dept: GYNECOLOGIC ONCOLOGY | Facility: CLINIC | Age: 62
End: 2022-09-08

## 2022-09-08 VITALS
DIASTOLIC BLOOD PRESSURE: 89 MMHG | BODY MASS INDEX: 72.88 KG/M2 | TEMPERATURE: 98.4 F | WEIGHT: 293 LBS | OXYGEN SATURATION: 100 % | SYSTOLIC BLOOD PRESSURE: 196 MMHG | HEART RATE: 81 BPM | RESPIRATION RATE: 15 BRPM

## 2022-09-08 DIAGNOSIS — Z85.42 HISTORY OF ENDOMETRIAL CANCER: Primary | ICD-10-CM

## 2022-09-08 PROCEDURE — 99213 OFFICE O/P EST LOW 20 MIN: CPT | Performed by: OBSTETRICS & GYNECOLOGY

## 2022-09-08 RX ORDER — POTASSIUM CHLORIDE 750 MG/1
TABLET, FILM COATED, EXTENDED RELEASE ORAL
COMMUNITY
Start: 2022-08-19 | End: 2022-09-08 | Stop reason: SDUPTHER

## 2022-09-08 RX ORDER — LIDOCAINE AND PRILOCAINE 25; 25 MG/G; MG/G
CREAM TOPICAL AS NEEDED
COMMUNITY
Start: 2022-08-19 | End: 2023-03-07

## 2022-09-08 RX ORDER — IBUPROFEN 800 MG/1
TABLET ORAL AS NEEDED
COMMUNITY
Start: 2022-07-18

## 2022-09-08 RX ORDER — AMLODIPINE BESYLATE 2.5 MG/1
TABLET ORAL
COMMUNITY
Start: 2022-08-19 | End: 2023-02-07

## 2023-01-25 ENCOUNTER — TELEPHONE (OUTPATIENT)
Dept: SURGERY | Facility: CLINIC | Age: 63
End: 2023-01-25
Payer: COMMERCIAL

## 2023-01-25 RX ORDER — POLYETHYLENE GLYCOL 3350 17 G/17G
238 POWDER, FOR SOLUTION ORAL ONCE
Qty: 17 PACKET | Refills: 0 | Status: SHIPPED | OUTPATIENT
Start: 2023-01-25 | End: 2023-01-25

## 2023-01-25 RX ORDER — BISACODYL 5 MG
5 TABLET, DELAYED RELEASE (ENTERIC COATED) ORAL TAKE AS DIRECTED
Qty: 4 TABLET | Refills: 0 | Status: SHIPPED | OUTPATIENT
Start: 2023-01-25 | End: 2023-03-08 | Stop reason: HOSPADM

## 2023-01-25 NOTE — TELEPHONE ENCOUNTER
PRESCREENING FOR OPEN ACCESS SCHEDULING    Analisa Cortez, 1960  [id number]    01/25/23    If, the patient answers yes to any of the following questions the provider will be informed prior to scheduling open access for approval and documented in the chart.    []  Yes  [x] No    1. Have you ever had a colonoscopy in the past?      When:        Where:       Polyps or other:     []  Yes  [x] No    2. Family history of colon cancer?      Relation:       Age of onset:       Do you currently have any of the following?    []  Yes  [x] No  Rectal bleeding, if so, how long?     []  Yes  [x] No  Abdominal pain, if so, how long?    []  Yes  [x] No  Constipation, if so, how long?    []  Yes  [x] No  Diarrhea, if so, how long?    []  Yes  [x] No  Weight loss, is so, how much?    [] Yes  [x] No  Small caliber stool, if so, how long?      Have you ever had any of the following conditions?    [] Yes  [x] No  Heart attack?      When?       Last cardiac workup?     Blood thinners?    [] Yes  [x] No   Lung problems, asthma or COPD?  [] Yes  [x] No  Oxygen required?       [] Yes  [x] No  Stroke?     [] Yes  [x] No  Have you ever had a reaction to anesthesia?

## 2023-01-26 ENCOUNTER — PREP FOR SURGERY (OUTPATIENT)
Dept: OTHER | Facility: HOSPITAL | Age: 63
End: 2023-01-26
Payer: COMMERCIAL

## 2023-01-26 DIAGNOSIS — R19.5 POSITIVE COLORECTAL CANCER SCREENING USING COLOGUARD TEST: Primary | ICD-10-CM

## 2023-01-30 ENCOUNTER — TELEPHONE (OUTPATIENT)
Dept: SURGERY | Facility: CLINIC | Age: 63
End: 2023-01-30
Payer: COMMERCIAL

## 2023-02-07 ENCOUNTER — OFFICE VISIT (OUTPATIENT)
Dept: CARDIOLOGY | Facility: CLINIC | Age: 63
End: 2023-02-07
Payer: COMMERCIAL

## 2023-02-07 VITALS
HEART RATE: 61 BPM | BODY MASS INDEX: 57.52 KG/M2 | SYSTOLIC BLOOD PRESSURE: 192 MMHG | DIASTOLIC BLOOD PRESSURE: 72 MMHG | WEIGHT: 293 LBS | OXYGEN SATURATION: 99 % | HEIGHT: 60 IN

## 2023-02-07 DIAGNOSIS — I10 PRIMARY HYPERTENSION: Primary | ICD-10-CM

## 2023-02-07 PROCEDURE — 93000 ELECTROCARDIOGRAM COMPLETE: CPT | Performed by: INTERNAL MEDICINE

## 2023-02-07 PROCEDURE — 99214 OFFICE O/P EST MOD 30 MIN: CPT | Performed by: INTERNAL MEDICINE

## 2023-02-07 RX ORDER — AMLODIPINE BESYLATE 5 MG/1
5 TABLET ORAL DAILY
Qty: 90 TABLET | Refills: 3 | Status: SHIPPED | COMMUNITY
Start: 2023-02-07 | End: 2023-02-08 | Stop reason: SDUPTHER

## 2023-02-07 RX ORDER — POTASSIUM CHLORIDE 750 MG/1
10 TABLET, EXTENDED RELEASE ORAL DAILY
Qty: 90 TABLET | Refills: 1 | Status: SHIPPED | OUTPATIENT
Start: 2023-02-07

## 2023-02-07 RX ORDER — SOTALOL HYDROCHLORIDE 80 MG/1
80 TABLET ORAL 2 TIMES DAILY
Qty: 180 TABLET | Refills: 3 | Status: SHIPPED | OUTPATIENT
Start: 2023-02-07

## 2023-02-07 RX ORDER — FUROSEMIDE 20 MG/1
20 TABLET ORAL 2 TIMES DAILY
Qty: 90 TABLET | Refills: 1 | Status: SHIPPED | OUTPATIENT
Start: 2023-02-07

## 2023-02-07 NOTE — PROGRESS NOTES
[facility] Cardiology    Encounter Date: 23     Patient ID: Analisa Cortez is a 62 y.o. female.  : 1960     PCP: Montserrat Patterson MD       Chief Complaint: Atrial Fibrillation      PROBLEM LIST:  1. Atrial fibrillation  a. EKG 2022: Atrial fibrillation with RVR  b. GENOVEVA 2022: EF 50%. Mild to moderate L atrial enlargement. Mild to moderate MR. Mild TR. No evidence of intracardiac thrombus or mass, clear L atrial appendage.   c. ECV 2022: Successful ECV and in SR.   2. Congestive heart failure  a. CXR 2021: Pulmonary vascular engorgement and cardiomegaly.  Calcified granuloma right upper lobe.  b. BNP 2021: 266  c. Echo 2022: LV systolic function mildly decreased.  LVEF 45 to 50%. Trace MR  3. Hypertension  4. Morbid obesity, Body mass index is 69.14 kg/m².   5. Endometrioid adenocarcinoma, requiring surgical clearance  a. Pathology report 2022: Endometrioid carcinoma, referral to gynecologic oncology 2022  6. Surgical history  a. D&C hysteroscopy 2022  b. Hysterectomy/BSO, 2022       History of Present Illness   Analisa Cortez for routine follow-up of hypertension and paroxysmal atrial fibrillation on sotalol.  She denies any recurrent symptoms of palpitations and has not had any chest pain or undue dyspnea.  She denies any worsening leg swelling but does report that she has been having some leg pain and swelling which she attributes more to musculoskeletal issues.    No Known Allergies      Current Outpatient Medications:   •  acetaminophen (TYLENOL) 325 MG tablet, Take 2 tablets by mouth Every 4 (Four) Hours As Needed for Mild Pain ., Disp: 60 tablet, Rfl: 0  •  amLODIPine (NORVASC) 2.5 MG tablet, , Disp: , Rfl:   •  bisacodyl (Dulcolax) 5 MG EC tablet, Take 1 tablet by mouth Take As Directed. Take 2 tablets 3pm and take 2 tablets at 5pm, Disp: 4 tablet, Rfl: 0  •  docusate sodium (COLACE) 100 MG capsule, Take 2  "capsules by mouth 2 (Two) Times a Day., Disp: 120 capsule, Rfl: 4  •  furosemide (LASIX) 20 MG tablet, Take 1 tablet by mouth 2 (Two) Times a Day., Disp: 90 tablet, Rfl: 1  •  ibuprofen (ADVIL,MOTRIN) 800 MG tablet, As Needed., Disp: , Rfl:   •  irbesartan (AVAPRO) 150 MG tablet, Take 1 tablet by mouth Daily., Disp: 90 tablet, Rfl: 0  •  lidocaine-prilocaine (EMLA) 2.5-2.5 % cream, As Needed., Disp: , Rfl:   •  potassium chloride (K-DUR,KLOR-CON) 10 MEQ CR tablet, Take 1 tablet by mouth Daily., Disp: 90 tablet, Rfl: 1  •  rivaroxaban (XARELTO) 15 MG tablet, Take 1 tablet by mouth Daily., Disp: 90 tablet, Rfl: 3  •  sotalol (BETAPACE) 80 MG tablet, Take 1 tablet by mouth 2 (Two) Times a Day., Disp: 180 tablet, Rfl: 3    The following portions of the patient's history were reviewed and updated as appropriate: allergies, current medications, past family history, past medical history, past social history, past surgical history and problem list.    Review of Systems   Cardiovascular: Positive for leg swelling. Negative for chest pain, dyspnea on exertion, irregular heartbeat and orthopnea.   Respiratory: Positive for snoring. Negative for shortness of breath.            Objective:     BP (!) 192/72 (BP Location: Left arm, Patient Position: Sitting)   Pulse 61   Ht 152.4 cm (60\")   Wt (!) 161 kg (354 lb)   SpO2 99%   BMI 69.14 kg/m²      Physical Exam  Constitutional: Patient appears well-developed and well-nourished.   HENT: HEENT exam unremarkable.   Neck: Neck supple. No JVD present. No carotid bruits.   Cardiovascular: Normal rate, regular rhythm and normal heart sounds. No murmur heard.   2+ symmetric pulses.   Pulmonary/Chest: Breath sounds normal. Does not exhibit tenderness.    Musculoskeletal: Does not exhibit edema.   Neurological: Neurological exam unremarkable.     Data Review:   Lab Results   Component Value Date    GLUCOSE 126 (H) 03/24/2022    BUN 26 (H) 03/24/2022    CREATININE 1.31 (H) 03/24/2022    " EGFRIFNONA 41 (L) 02/16/2022    BCR 19.8 03/24/2022    K 4.3 03/24/2022    CO2 20.0 (L) 03/24/2022    CALCIUM 9.3 03/24/2022    ALBUMIN 4.30 02/16/2022    AST 14 02/16/2022    ALT 19 02/16/2022     Lab Results   Component Value Date    WBC 5.43 03/24/2022    RBC 3.95 03/24/2022    HGB 11.8 (L) 03/24/2022    HCT 37.5 03/24/2022    MCV 94.9 03/24/2022     03/24/2022     Lab Results   Component Value Date    HGBA1C 5.70 (H) 02/16/2022          ECG 12 Lead    Date/Time: 2/7/2023 10:59 PM  Performed by: Greg Slaughter MD  Authorized by: Greg Slaughter MD   Comparison: compared with previous ECG from 5/31/2022  Similar to previous ECG  Rhythm: sinus bradycardia  Rate: bradycardic  BPM: 53  Conduction: conduction normal  ST Segments: ST segments normal  T inversion: III  T flattening: aVF, V3, V1 and V2  QRS axis: normal  Other findings: non-specific ST-T wave changes and low voltage  Other findings comments:  ms, QTc 427 ms    Clinical impression: abnormal EKG                     Assessment:      Diagnosis Plan   1. Atrial fibrillation with RVR (HCC)  No recurrent palpitations. Continue on Xarelto 15 mg and sotalol 80 mg twice daily.     2. Primary hypertension   very uncontrolled today relative to most recent cardiology visit however vitals from September 2022 show similar readings    Refills of antihypertensive prescribed, will refer patient to sleep for evaluation for possible CPAP given her uncontrolled hypertension as well as atrial fibrillation    Vic the patient to continue monitoring her BP at home and with her PCP.  She does have a wrist cuff at home however I am not sure this is the correct size for most accurate       Plan:     Return in about 6 months (around 8/7/2023).   Thank you for allowing us to participate in the care of your patient.     VIRGINIA Slaughter MD  02/07/23 12:02 EST

## 2023-02-08 RX ORDER — AMLODIPINE BESYLATE 5 MG/1
5 TABLET ORAL DAILY
Qty: 90 TABLET | Refills: 3 | Status: SHIPPED | OUTPATIENT
Start: 2023-02-08

## 2023-02-09 PROBLEM — R19.5 POSITIVE COLORECTAL CANCER SCREENING USING COLOGUARD TEST: Status: ACTIVE | Noted: 2023-02-09

## 2023-02-13 NOTE — TELEPHONE ENCOUNTER
PRESCREENING FOR OPEN ACCESS SCHEDULING    Analisa Cortez, 1960  [id number]    02/13/23    If, the patient answers yes to any of the following questions the provider will be informed prior to scheduling open access for approval and documented in the chart.    [x]  Yes  [] No    1. Have you ever had a colonoscopy in the past?      When:        Where:       Polyps or other:     []  Yes  [x] No    2. Family history of colon cancer?      Relation:       Age of onset:       Do you currently have any of the following?    []  Yes  [x] No  Rectal bleeding, if so, how long?     []  Yes  [x] No  Abdominal pain, if so, how long?    []  Yes  [x] No  Constipation, if so, how long?    []  Yes  [x] No  Diarrhea, if so, how long?    []  Yes  [x] No  Weight loss, is so, how much?    [] Yes  [x] No  Small caliber stool, if so, how long?      Have you ever had any of the following conditions?    [] Yes  [x] No  Heart attack?      When?       Last cardiac workup?     Blood thinners?    [] Yes  [x] No   Lung problems, asthma or COPD?  [] Yes  [x] No  Oxygen required?       [] Yes  [x] No  Stroke?     [] Yes  [x] No  Have you ever had a reaction to anesthesia?

## 2023-03-03 ENCOUNTER — TELEPHONE (OUTPATIENT)
Dept: SURGERY | Facility: CLINIC | Age: 63
End: 2023-03-03
Payer: COMMERCIAL

## 2023-03-03 NOTE — TELEPHONE ENCOUNTER
Spoke with Analisa to confirm the procedure on 03/08/2023. She was asking about her Ibuprofen if she can still take this.

## 2023-03-08 ENCOUNTER — ANESTHESIA EVENT (OUTPATIENT)
Dept: GASTROENTEROLOGY | Facility: HOSPITAL | Age: 63
End: 2023-03-08
Payer: COMMERCIAL

## 2023-03-08 ENCOUNTER — ANESTHESIA (OUTPATIENT)
Dept: GASTROENTEROLOGY | Facility: HOSPITAL | Age: 63
End: 2023-03-08
Payer: COMMERCIAL

## 2023-03-08 ENCOUNTER — HOSPITAL ENCOUNTER (OUTPATIENT)
Facility: HOSPITAL | Age: 63
Setting detail: HOSPITAL OUTPATIENT SURGERY
Discharge: HOME | End: 2023-03-08
Attending: SURGERY | Admitting: SURGERY
Payer: COMMERCIAL

## 2023-03-08 VITALS
TEMPERATURE: 98.2 F | RESPIRATION RATE: 16 BRPM | WEIGHT: 293 LBS | HEIGHT: 60 IN | DIASTOLIC BLOOD PRESSURE: 73 MMHG | BODY MASS INDEX: 57.52 KG/M2 | SYSTOLIC BLOOD PRESSURE: 147 MMHG | OXYGEN SATURATION: 99 % | HEART RATE: 69 BPM

## 2023-03-08 DIAGNOSIS — R19.5 POSITIVE COLORECTAL CANCER SCREENING USING COLOGUARD TEST: ICD-10-CM

## 2023-03-08 PROCEDURE — S0260 H&P FOR SURGERY: HCPCS | Performed by: SURGERY

## 2023-03-08 PROCEDURE — 25010000002 PROPOFOL 200 MG/20ML EMULSION: Performed by: NURSE ANESTHETIST, CERTIFIED REGISTERED

## 2023-03-08 RX ORDER — PROPOFOL 10 MG/ML
INJECTION, EMULSION INTRAVENOUS AS NEEDED
Status: DISCONTINUED | OUTPATIENT
Start: 2023-03-08 | End: 2023-03-08 | Stop reason: SURG

## 2023-03-08 RX ORDER — LIDOCAINE HYDROCHLORIDE 20 MG/ML
INJECTION, SOLUTION INTRAVENOUS AS NEEDED
Status: DISCONTINUED | OUTPATIENT
Start: 2023-03-08 | End: 2023-03-08 | Stop reason: SURG

## 2023-03-08 RX ORDER — KETAMINE HCL IN NACL, ISO-OSM 100MG/10ML
SYRINGE (ML) INJECTION AS NEEDED
Status: DISCONTINUED | OUTPATIENT
Start: 2023-03-08 | End: 2023-03-08 | Stop reason: SURG

## 2023-03-08 RX ORDER — ONDANSETRON 2 MG/ML
4 INJECTION INTRAMUSCULAR; INTRAVENOUS ONCE AS NEEDED
Status: DISCONTINUED | OUTPATIENT
Start: 2023-03-08 | End: 2023-03-08 | Stop reason: HOSPADM

## 2023-03-08 RX ORDER — SIMETHICONE 20 MG/.3ML
EMULSION ORAL AS NEEDED
Status: DISCONTINUED | OUTPATIENT
Start: 2023-03-08 | End: 2023-03-08 | Stop reason: HOSPADM

## 2023-03-08 RX ORDER — SODIUM CHLORIDE, SODIUM LACTATE, POTASSIUM CHLORIDE, CALCIUM CHLORIDE 600; 310; 30; 20 MG/100ML; MG/100ML; MG/100ML; MG/100ML
1000 INJECTION, SOLUTION INTRAVENOUS CONTINUOUS
Status: DISCONTINUED | OUTPATIENT
Start: 2023-03-08 | End: 2023-03-08 | Stop reason: HOSPADM

## 2023-03-08 RX ORDER — SODIUM CHLORIDE 0.9 % (FLUSH) 0.9 %
10 SYRINGE (ML) INJECTION AS NEEDED
Status: DISCONTINUED | OUTPATIENT
Start: 2023-03-08 | End: 2023-03-08 | Stop reason: HOSPADM

## 2023-03-08 RX ADMIN — PROPOFOL 50 MG: 10 INJECTION, EMULSION INTRAVENOUS at 07:43

## 2023-03-08 RX ADMIN — LIDOCAINE HYDROCHLORIDE 60 MG: 20 INJECTION, SOLUTION INTRAVENOUS at 07:43

## 2023-03-08 RX ADMIN — PROPOFOL 50 MG: 10 INJECTION, EMULSION INTRAVENOUS at 08:01

## 2023-03-08 RX ADMIN — Medication 25 MG: at 07:43

## 2023-03-08 RX ADMIN — SODIUM CHLORIDE, POTASSIUM CHLORIDE, SODIUM LACTATE AND CALCIUM CHLORIDE 1000 ML: 600; 310; 30; 20 INJECTION, SOLUTION INTRAVENOUS at 06:49

## 2023-03-08 RX ADMIN — PROPOFOL 50 MG: 10 INJECTION, EMULSION INTRAVENOUS at 07:57

## 2023-03-08 RX ADMIN — PROPOFOL 50 MG: 10 INJECTION, EMULSION INTRAVENOUS at 07:49

## 2023-03-08 RX ADMIN — PROPOFOL 50 MG: 10 INJECTION, EMULSION INTRAVENOUS at 07:53

## 2023-03-08 NOTE — ANESTHESIA PREPROCEDURE EVALUATION
Anesthesia Evaluation     Patient summary reviewed and Nursing notes reviewed   no history of anesthetic complications:  NPO Solid Status: > 8 hours  NPO Liquid Status: > 8 hours           Airway   Mallampati: II  TM distance: >3 FB  Possible difficult intubation  Dental - normal exam     Pulmonary - negative pulmonary ROS and normal exam   Cardiovascular - normal exam  Exercise tolerance: poor (<4 METS)    ECG reviewed    (+) hypertension poorly controlled 2 medications or greater, dysrhythmias Atrial Fib,     ROS comment: ECHO: EF 45-50%    Neuro/Psych- negative ROS  GI/Hepatic/Renal/Endo    (+) obesity, morbid obesity,      Musculoskeletal     Abdominal   (+) obese,    Substance History      OB/GYN          Other   arthritis,    history of cancer                      Anesthesia Plan    ASA 3     MAC     (Discussed POC with Dr. Pandey. Discussed risks and benefits. Decided to proceed with minimal sedation and biopsy to get diagnosis. Pt and daughter-in-law informed of risks and benefits )  intravenous induction     Anesthetic plan, risks, benefits, and alternatives have been provided, discussed and informed consent has been obtained with: patient.    Plan discussed with CRNA.        CODE STATUS:

## 2023-03-08 NOTE — ADDENDUM NOTE
Addendum  created 03/08/23 0914 by Benjamin Burden, CRNA    Review and Sign - Ready for Procedure

## 2023-03-08 NOTE — ANESTHESIA POSTPROCEDURE EVALUATION
Patient: Analisa Cortez    Procedure Summary     Date: 03/08/23 Room / Location: [facility] 2 / [facility]    Anesthesia Start: 0743 Anesthesia Stop: 0806    Procedure: COLONOSCOPY (Anus) Diagnosis:       Positive colorectal cancer screening using Cologuard test      (Positive colorectal cancer screening using Cologuard test [R19.5])    Surgeons: Benny Jaquez MD Provider: Benjamin Burden CRNA    Anesthesia Type: MAC ASA Status: 3          Anesthesia Type: MAC    Vitals  No vitals data found for the desired time range.          Post Anesthesia Care and Evaluation    Patient location during evaluation: bedside  Patient participation: complete - patient participated  Level of consciousness: awake and alert  Pain score: 0  Pain management: adequate    Airway patency: patent  Anesthetic complications: No anesthetic complications  PONV Status: none  Cardiovascular status: acceptable  Respiratory status: acceptable  Hydration status: acceptable

## 2023-03-08 NOTE — H&P
[facility]   HISTORY AND PHYSICAL      Name:  Analisa Cortez   Age:  62 y.o.  Sex:  female  :  1960  MRN:  [id number]   Visit Number:  [id number]  Admission Date:  3/8/2023  Date Of Service:  23  Primary Care Physician:  Montserrat Patterson MD    Chief Complaint:     I need a colonoscopy    History Of Presenting Illness:      Patient here for her first colonoscopy.  Has no GI complaints.  No family history of colon cancer.    Review Of Systems:     General ROS: Patient denies any fevers, chills or loss of consciousness.  No complaints of generalized weakness  Psychological ROS: Denies any hallucinations and delusions.  Respiratory ROS: Denies cough or shortness of breath.   Cardiovascular ROS: Denies chest pain or palpitations. No history of exertional chest pain.   Gastrointestinal ROS: Denies nausea and vomiting. Denies any abdominal pain. No diarrhea.   Genito-Urinary ROS: Denies dysuria or hematuria.  Neurological ROS: Denies any focal weakness. No loss of consciousness. Denies any numbness.   Dermatological ROS: Denies any redness or pruritis.     Past Medical History:    Past Medical History:   Diagnosis Date   • Arthritis    • Atrial fibrillation (HCC)    • Frequent UTI    • Heart rate fast     at times   • Hypertension    • Seasonal allergies    • Uterine cancer (HCC) 10/2021   • Wears glasses     for reading       Past Surgical history:    Past Surgical History:   Procedure Laterality Date   • DILATATION AND CURETTAGE N/A 2022    Procedure: DILATATION AND CURETTAGE;  Surgeon: Estella Pandey MD;  Location: [facility];  Service: Obstetrics/Gynecology;  Laterality: N/A;   • TOTAL LAPAROSCOPIC HYSTERECTOMY SALPINGO OOPHORECTOMY N/A 2022    Procedure: TOTAL LAPAROSCOPIC HYSTERECTOMY BILATERAL SALPINGOOPHORECTOMY, INJECTION FOR SENTINEL LYMPH NODE MAPPING, BILATERAL PELVIC SENTINEL LYMPH NODE DISSECTION WITH DAVINCI ROBOT;  Surgeon: Maria Elena Marie MD;   Location: [facility];  Service: Robotics - DaVinci;  Laterality: N/A;       Social History:    Social History     Socioeconomic History   • Marital status:    Tobacco Use   • Smoking status: Never   • Smokeless tobacco: Never   Vaping Use   • Vaping Use: Never used   Substance and Sexual Activity   • Alcohol use: Never   • Drug use: Never   • Sexual activity: Defer       Family History:    Family History   Problem Relation Age of Onset   • Osteoporosis Mother    • Arthritis Mother    • No Known Problems Father    • Cancer Maternal Aunt         Bladder   • Breast cancer Cousin    • Breast cancer Cousin    • Ovarian cancer Neg Hx    • Uterine cancer Neg Hx    • Colon cancer Neg Hx    • Melanoma Neg Hx    • Prostate cancer Neg Hx        Allergies:      Patient has no known allergies.    Home Medications:    Prior to Admission Medications     Prescriptions Last Dose Informant Patient Reported? Taking?    amLODIPine (NORVASC) 5 MG tablet 3/8/2023 Self No Yes    Take 1 tablet by mouth Daily.    Patient taking differently:  Take 2 tablets by mouth Daily.    bisacodyl (Dulcolax) 5 MG EC tablet 3/7/2023  No Yes    Take 1 tablet by mouth Take As Directed. Take 2 tablets 3pm and take 2 tablets at 5pm    docusate sodium (COLACE) 100 MG capsule 3/7/2023  No Yes    Take 2 capsules by mouth 2 (Two) Times a Day.    furosemide (LASIX) 20 MG tablet 3/7/2023  No Yes    Take 1 tablet by mouth 2 (Two) Times a Day.    ibuprofen (ADVIL,MOTRIN) 800 MG tablet Past Week  Yes Yes    As Needed.    irbesartan (AVAPRO) 150 MG tablet 3/8/2023  No Yes    Take 1 tablet by mouth Daily.    potassium chloride (K-DUR,KLOR-CON) 10 MEQ CR tablet 3/7/2023  No Yes    Take 1 tablet by mouth Daily.    rivaroxaban (XARELTO) 15 MG tablet Past Week  No Yes    Take 1 tablet by mouth Daily.    sotalol (BETAPACE) 80 MG tablet 3/8/2023  No Yes    Take 1 tablet by mouth 2 (Two) Times a Day.             ED Medications:    Medications   lactated ringers infusion  1,000 mL ( Intravenous Restarted 3/8/23 0743)       Vital Signs:    Temp:  [97.7 °F (36.5 °C)] 97.7 °F (36.5 °C)  Heart Rate:  [83] 83  Resp:  [18] 18  BP: (173)/(88) 173/88        03/07/23  0956   Weight: (!) 159 kg (350 lb)       Body mass index is 68.35 kg/m².    Physical Exam:      General Appearance:  Alert and cooperative, not in any acute distress.   Head:  Atraumatic and normocephalic, without obvious abnormality.   Eyes:          PERRLA, conjunctivae and sclerae normal, no Icterus. No pallor. Extra-occular movements are within normal limits.   Ears:  Ears appear intact with no abnormalities noted.   Respiratory/Lungs:   Breath sounds heard bilaterally equally.  No crackles or wheezing. No Pleural rub or bronchial breathing. Normal respiratory effort.    Cardiovascular/Heart:  Normal S1 and S2,    GI/Abdomen:   No masses, no hepatosplenomegaly. Soft, non-tender, non-distended, no hernia                 Musculoskeletal/ Extremities:   Moves all extremities well   Skin: No bleeding, bruising or rash, no induration   Psychiatric : Alert and oriented ×3.  No depression or anxiety    Neurologic: Cranial nerves 2 - 12 grossly intact, sensation intact, Motor power is normal and equal bilaterally.       EKG:          Labs:    Lab Results (last 24 hours)     ** No results found for the last 24 hours. **          Radiology:    Imaging Results (Last 72 Hours)     ** No results found for the last 72 hours. **          Assessment:    Screening colonoscopy     Plan:     I recommend a screening colonoscopy to the patient.  The patient understands the procedure and the reason for the procedure.  The patient also understands the risks which include but are not limited to bleeding and perforation and they understand the ramifications of these potential complications including operative intervention and wish to proceed.    Benny Jaquez MD  03/08/23  07:45 EST

## 2023-03-09 LAB — REF LAB TEST METHOD: NORMAL

## 2023-03-30 ENCOUNTER — OFFICE VISIT (OUTPATIENT)
Dept: GYNECOLOGIC ONCOLOGY | Facility: CLINIC | Age: 63
End: 2023-03-30
Payer: COMMERCIAL

## 2023-03-30 VITALS
OXYGEN SATURATION: 98 % | SYSTOLIC BLOOD PRESSURE: 200 MMHG | TEMPERATURE: 97.5 F | BODY MASS INDEX: 57.52 KG/M2 | HEIGHT: 60 IN | DIASTOLIC BLOOD PRESSURE: 78 MMHG | HEART RATE: 75 BPM | RESPIRATION RATE: 22 BRPM | WEIGHT: 293 LBS

## 2023-03-30 DIAGNOSIS — Z85.42 HISTORY OF UTERINE CANCER: Primary | ICD-10-CM

## 2023-03-30 DIAGNOSIS — K59.00 CONSTIPATION, UNSPECIFIED CONSTIPATION TYPE: ICD-10-CM

## 2023-03-30 PROCEDURE — 99213 OFFICE O/P EST LOW 20 MIN: CPT | Performed by: OBSTETRICS & GYNECOLOGY

## 2023-03-30 RX ORDER — DOCUSATE SODIUM 100 MG/1
200 CAPSULE, LIQUID FILLED ORAL 2 TIMES DAILY
Qty: 120 CAPSULE | Refills: 11 | Status: SHIPPED | OUTPATIENT
Start: 2023-03-30

## 2023-03-30 NOTE — PROGRESS NOTES
Follow Up Office Visit      Patient Name: Analisa Cortez  : 1960   MRN: [id number]     Chief Complaint:  Follow up, history of endometrial cancer    History of Present Illness: Analisa Cortez is a 62 y.o. female who is here today to follow up with Gynecologic Oncology for stage IA grade 2 endometrial cancer. Today, she is doing well. She denies vaginal bleeding and discharge. She does not have abdominal or pelvic pain. She is tolerating a regular diet and endorses a normal appetite. She denies nausea and vomiting. She denies early satiety and bloating. Denies any lightheadedness or dizziness. She denies changes in her bowel/bladder. She denies fevers, chills, SOA, CP, lower extremity swelling increased from baseline.    Oncologic History:  Oncology/Hematology History   Endometrial cancer (HCC)   2022 Initial Diagnosis    Referred by LATONIA Leung/Dr. Estella Pandey    Patient presented with postmenopausal bleeding    2022: TVUS with uterus with endometrial stripe thickness of 29 mm.  Unable to visualize either ovary.  No obvious adnexal masses.  Scant free fluid.  2022: Hysteroscopy with D&C with well to moderately differentiated endometrioid adenocarcinoma     2022 Surgery    Robotic assisted total laparoscopic hysterectomy with bilateral salpingo-oophorectomy and bilateral pelvic sentinel lymph node dissection.    Pathology with a FIGO grade 2 endometrioid adenocarcinoma with 20% myometrial involvement.  Negative LVSI.  Negative cervix and adnexa.  Negative sentinel lymph nodes.  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    [facility] by [facility] Marie MD         2022 Cancer Staged    Cancer Staging  Endometrial cancer (HCC)  Staging form: Corpus Uteri - Carcinoma And Carcinosarcoma, AJCC 8th Edition  - Pathologic stage from 2022: FIGO Stage IA (pT1a, pN0(sn), cM0) - Signed by Maria Elena Marie MD on 2022 Cancer  "Staged    Staging form: Corpus Uteri - Carcinoma And Carcinosarcoma, AJCC 8th Edition  - Pathologic stage from 2/18/2022: FIGO Stage IA (pT1a, pN0(sn), cM0) - Signed by Maria Elena Marie MD on 9/8/2022          I have reviewed and the following portions of the patient's history were updated as appropriate: past family history, past medical history, past social history, past surgical history, past obstetrics/gynecologic history and problem list.    Medications: The current medication list was reviewed with the patient and updated in the EMR this date per the Medical Assistant. Medication dosages and frequencies were confirmed to be accurate.      Allergies:   No Known Allergies    Objective     Physical Exam:  Vital Signs:   Vitals:    03/30/23 1253   BP: (!) 200/78  Comment: lower forarm reading per pt request   Pulse: 75   Resp: 22   Temp: 97.5 °F (36.4 °C)   TempSrc: Temporal   SpO2: 98%   Weight: (!) 160 kg (353 lb 6.4 oz)   Height: 152.4 cm (60\")   PainSc: 0-No pain     BMI: Body mass index is 69.02 kg/m².   ECOG score: 1           PHQ-2 Depression Screening  Little interest or pleasure in doing things? 0-->not at all   Feeling down, depressed, or hopeless? 0-->not at all   PHQ-2 Total Score 0     Physical Examination:   General appearance - alert, well appearing, and in no distress and oriented to person, place, and time  Mental status - normal mood, behavior, speech, dress, motor activity, and thought processes  Eyes - sclera anicteric, left eye normal, right eye normal  Ears - right ear normal, left ear normal  Nose - mask  Mouth - mask  Heart - normal rate, regular rhythm, normal S1, S2, no murmurs, rubs, clicks or gallops  Abdomen - soft, nontender, nondistended, no masses or organomegaly  Pelvic - Speculum exam limited due to BMI. On bimanual exam, Vagina smooth without lesions, bleeding, discharge. Vaginal cuff intact.   Neurological - alert, oriented, normal speech, no focal findings or movement disorder " noted  Musculoskeletal - no joint tenderness, deformity or swelling  Skin - normal coloration and turgor, no rashes, no suspicious skin lesions noted     Assessment / Plan    Analisa Cortez is a 62 y.o. with a history of a stage IA grade 2 endometrial cancer with no LVSI s/p surgical management completed in 2/2022. She is presenting today for a clinical exam/surveillance visit.  She is currently without clinical evidence of disease. Signs of recurrent disease, such as vaginal bleeding, persistent abdominopelvic pain, urinary or bowel changes, and shortness of breath were reviewed.  She was advised to follow up immediately if she develops any of the above symptoms.     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 She was previously provided a home kit for genetic testing and followed up with genetic counselor in May 2022. She has not completed this testing. We will continue to try to encourage her to complete this.    Constipation: Refilled colace today.    HTN: Severe ranges pressures today. Asymptomatic. Encouraged follow up with PCP and cardiology.     Health maintenance: She was reminded to maintain a healthy lifestyle with a well balanced diet, calcium and vitamin D for osteoporosis prevention, and exercise as well as continue with recommended health and cancer screening guidelines.     Diagnoses and all orders for this visit:    1. History of uterine cancer (Primary)    2. Constipation, unspecified constipation type    Other orders  -     docusate sodium (COLACE) 100 MG capsule; Take 2 capsules by mouth 2 (Two) Times a Day.  Dispense: 120 capsule; Refill: 11         Follow Up: 6 months     Charito Bunch MD  Gynecologic Oncology Resident    Patient was seen and examined with Dr. Bunch,  resident, who performed portions of the examination and documentation for this patient's care under my direct supervision.  I agree with the above documentation and  plan.    Maria Elena Marie MD  Gynecologic Oncology

## (undated) DEVICE — QUICK CATCH IN-LINE SUCTION POLYP TRAP IS USED FOR SUCTION RETRIEVAL OF ENDOSCOPICALLY REMOVED POLYPS.

## (undated) DEVICE — APPL CHLORAPREP TINTED 26ML TEAL

## (undated) DEVICE — TRENDELENBURG WINGPAD POSITIONING KIT DELUXE - WITHOUT BODY STRAP: Brand: SOULE MEDICAL

## (undated) DEVICE — ADHS SKIN PREMIERPRO EXOFIN TOPICAL HI/VISC .5ML

## (undated) DEVICE — ANTIBACTERIAL UNDYED BRAIDED (POLYGLACTIN 910), SYNTHETIC ABSORBABLE SUTURE: Brand: COATED VICRYL

## (undated) DEVICE — TIP COVER ACCESSORY

## (undated) DEVICE — RICH MINOR LITHOTOMY: Brand: MEDLINE INDUSTRIES, INC.

## (undated) DEVICE — VLV SXN AIR/H2O ORCAPOD3 1P/U STRL

## (undated) DEVICE — SUT GUT CHRM 2/0 SH 27IN G123H

## (undated) DEVICE — GLV SURG SENSICARE W/ALOE PF LF 6.5 STRL

## (undated) DEVICE — PATIENT RETURN ELECTRODE, SINGLE-USE, CONTACT QUALITY MONITORING, ADULT, WITH 9FT CORD, FOR PATIENTS WEIGING OVER 33LBS. (15KG): Brand: MEGADYNE

## (undated) DEVICE — PK MAJ GYN DAVINCI 10

## (undated) DEVICE — ENDOPATH PNEUMONEEDLE INSUFFLATION NEEDLES WITH LUER LOCK CONNECTORS 120MM: Brand: ENDOPATH

## (undated) DEVICE — SOL IRR H2O BTL 1000ML STRL

## (undated) DEVICE — SUT MNCRYL PLS ANTIB UD 3/0 PS2 27IN

## (undated) DEVICE — ANTIBACTERIAL UNDYED BRAIDED (POLYGLACTIN 910), SYNTHETIC ABSORBABLE SURGICAL SUTURE: Brand: COATED VICRYL

## (undated) DEVICE — SINGLE-USE POLYPECTOMY SNARE: Brand: CAPTIVATOR II

## (undated) DEVICE — GLV SURG BIOGEL M LTX PF 6 1/2

## (undated) DEVICE — SYR LUERLOK 5CC

## (undated) DEVICE — BLADELESS OBTURATOR: Brand: WECK VISTA

## (undated) DEVICE — HYBRID TUBING/CAP SET FOR OLYMPUS® SCOPES: Brand: ERBE

## (undated) DEVICE — ST TBG CONN PNEUMOCLEAR EVAC SMOKE HEAT/HUMID

## (undated) DEVICE — SOL NACL 0.9PCT 1000ML

## (undated) DEVICE — Device

## (undated) DEVICE — CANNULA SEAL

## (undated) DEVICE — MANIP UTER RUMI 2 KOH EFFICIENT 3CM BL

## (undated) DEVICE — BLANKT WARM UPPR/BDY ARM/OUT 57X196CM

## (undated) DEVICE — MANIP UTER RUMI TP 5.1MM 6CM LAV

## (undated) DEVICE — SHEET, DRAPE, SPLIT, STERILE: Brand: MEDLINE

## (undated) DEVICE — COLUMN DRAPE

## (undated) DEVICE — UNDERGLV SURG BIOGEL INDICAT PF 61/2 GRN

## (undated) DEVICE — ARM DRAPE

## (undated) DEVICE — ANCHOR TISSUE RETRIEVAL SYSTEM, BAG SIZE 125 ML, PORT SIZE 8 MM: Brand: ANCHOR TISSUE RETRIEVAL SYSTEM

## (undated) DEVICE — ENDOSCOPY PORT CONNECTOR FOR OLYMPUS® SCOPES: Brand: ERBE

## (undated) DEVICE — ST IRR CYSTO W/SPK 77IN LF